# Patient Record
Sex: FEMALE | Race: WHITE | URBAN - METROPOLITAN AREA
[De-identification: names, ages, dates, MRNs, and addresses within clinical notes are randomized per-mention and may not be internally consistent; named-entity substitution may affect disease eponyms.]

---

## 2020-09-25 ENCOUNTER — APPOINTMENT (RX ONLY)
Dept: URBAN - METROPOLITAN AREA CLINIC 352 | Facility: CLINIC | Age: 80
Setting detail: DERMATOLOGY
End: 2020-09-25

## 2020-09-25 DIAGNOSIS — L82.1 OTHER SEBORRHEIC KERATOSIS: ICD-10-CM

## 2020-09-25 DIAGNOSIS — Z12.83 ENCOUNTER FOR SCREENING FOR MALIGNANT NEOPLASM OF SKIN: ICD-10-CM

## 2020-09-25 DIAGNOSIS — L57.8 OTHER SKIN CHANGES DUE TO CHRONIC EXPOSURE TO NONIONIZING RADIATION: ICD-10-CM

## 2020-09-25 DIAGNOSIS — D18.0 HEMANGIOMA: ICD-10-CM

## 2020-09-25 DIAGNOSIS — D22 MELANOCYTIC NEVI: ICD-10-CM

## 2020-09-25 DIAGNOSIS — L82.0 INFLAMED SEBORRHEIC KERATOSIS: ICD-10-CM

## 2020-09-25 DIAGNOSIS — L81.4 OTHER MELANIN HYPERPIGMENTATION: ICD-10-CM

## 2020-09-25 PROBLEM — D22.9 MELANOCYTIC NEVI, UNSPECIFIED: Status: ACTIVE | Noted: 2020-09-25

## 2020-09-25 PROBLEM — D18.01 HEMANGIOMA OF SKIN AND SUBCUTANEOUS TISSUE: Status: ACTIVE | Noted: 2020-09-25

## 2020-09-25 PROCEDURE — 99203 OFFICE O/P NEW LOW 30 MIN: CPT | Mod: 25

## 2020-09-25 PROCEDURE — ? LIQUID NITROGEN (CM)

## 2020-09-25 PROCEDURE — ? ADDITIONAL NOTES

## 2020-09-25 PROCEDURE — ? FULL BODY SKIN EXAM

## 2020-09-25 PROCEDURE — 17110 DESTRUCTION B9 LES UP TO 14: CPT

## 2020-09-25 PROCEDURE — ? COUNSELING

## 2020-09-25 ASSESSMENT — LOCATION SIMPLE DESCRIPTION DERM
LOCATION SIMPLE: ABDOMEN
LOCATION SIMPLE: ABDOMEN

## 2020-09-25 ASSESSMENT — LOCATION DETAILED DESCRIPTION DERM
LOCATION DETAILED: LEFT LATERAL ABDOMEN
LOCATION DETAILED: LEFT LATERAL ABDOMEN

## 2020-09-25 ASSESSMENT — LOCATION ZONE DERM
LOCATION ZONE: TRUNK
LOCATION ZONE: TRUNK

## 2020-09-25 NOTE — PROCEDURE: LIQUID NITROGEN
Consent: The patient's consent was obtained including but not limited to risks of crusting, scabbing, blistering, scarring, darker or lighter pigmentary change, recurrence, incomplete removal and infection.
Post-Care Instructions: I reviewed with the patient in detail post-care instructions. Patient is to wear sunprotection, and avoid picking at any of the treated lesions. Pt may apply Vaseline to crusted or scabbing areas.
Medical Necessity Information: It is in your best interest to select a reason for this procedure from the list below. All of these items fulfill various CMS LCD requirements except the new and changing color options.
Add 52 Modifier (Optional): no
Medical Necessity Clause: This procedure was medically necessary because the lesions that were treated were:
Detail Level: Detailed
Size Of Lesion In Cm (Optional): 0

## 2021-10-25 ENCOUNTER — APPOINTMENT (RX ONLY)
Dept: URBAN - METROPOLITAN AREA CLINIC 78 | Facility: CLINIC | Age: 81
Setting detail: DERMATOLOGY
End: 2021-10-25

## 2021-10-25 DIAGNOSIS — D22 MELANOCYTIC NEVI: ICD-10-CM

## 2021-10-25 DIAGNOSIS — L81.4 OTHER MELANIN HYPERPIGMENTATION: ICD-10-CM

## 2021-10-25 DIAGNOSIS — L82.1 OTHER SEBORRHEIC KERATOSIS: ICD-10-CM

## 2021-10-25 DIAGNOSIS — L65.9 NONSCARRING HAIR LOSS, UNSPECIFIED: ICD-10-CM

## 2021-10-25 DIAGNOSIS — Z12.83 ENCOUNTER FOR SCREENING FOR MALIGNANT NEOPLASM OF SKIN: ICD-10-CM

## 2021-10-25 PROBLEM — D22.9 MELANOCYTIC NEVI, UNSPECIFIED: Status: ACTIVE | Noted: 2021-10-25

## 2021-10-25 PROCEDURE — 99213 OFFICE O/P EST LOW 20 MIN: CPT

## 2021-10-25 PROCEDURE — ? COUNSELING

## 2021-10-25 PROCEDURE — ? FULL BODY SKIN EXAM

## 2021-10-25 PROCEDURE — ? ADDITIONAL NOTES

## 2021-10-25 ASSESSMENT — LOCATION DETAILED DESCRIPTION DERM
LOCATION DETAILED: RIGHT SUPERIOR MEDIAL FOREHEAD
LOCATION DETAILED: LEFT INFERIOR MEDIAL MALAR CHEEK
LOCATION DETAILED: LEFT LATERAL PROXIMAL PRETIBIAL REGION

## 2021-10-25 ASSESSMENT — LOCATION SIMPLE DESCRIPTION DERM
LOCATION SIMPLE: RIGHT FOREHEAD
LOCATION SIMPLE: LEFT PRETIBIAL REGION
LOCATION SIMPLE: LEFT CHEEK

## 2021-10-25 ASSESSMENT — LOCATION ZONE DERM
LOCATION ZONE: FACE
LOCATION ZONE: LEG

## 2022-05-11 ENCOUNTER — OFFICE VISIT (OUTPATIENT)
Dept: INTERNAL MEDICINE CLINIC | Age: 82
End: 2022-05-11
Payer: MEDICARE

## 2022-05-11 VITALS
HEART RATE: 54 BPM | RESPIRATION RATE: 14 BRPM | TEMPERATURE: 97.3 F | OXYGEN SATURATION: 98 % | DIASTOLIC BLOOD PRESSURE: 70 MMHG | SYSTOLIC BLOOD PRESSURE: 126 MMHG | HEIGHT: 64 IN | WEIGHT: 178 LBS | BODY MASS INDEX: 30.39 KG/M2

## 2022-05-11 DIAGNOSIS — R79.9 ABNORMAL FINDING OF BLOOD CHEMISTRY, UNSPECIFIED: ICD-10-CM

## 2022-05-11 DIAGNOSIS — I10 PRIMARY HYPERTENSION: ICD-10-CM

## 2022-05-11 DIAGNOSIS — L60.9 NAIL ABNORMALITY: ICD-10-CM

## 2022-05-11 DIAGNOSIS — R42 DIZZINESS: ICD-10-CM

## 2022-05-11 DIAGNOSIS — R76.11 POSITIVE TB TEST: ICD-10-CM

## 2022-05-11 DIAGNOSIS — G62.9 NEUROPATHY: ICD-10-CM

## 2022-05-11 DIAGNOSIS — R00.1 BRADYCARDIA: ICD-10-CM

## 2022-05-11 DIAGNOSIS — J30.9 ALLERGIC RHINITIS, UNSPECIFIED SEASONALITY, UNSPECIFIED TRIGGER: ICD-10-CM

## 2022-05-11 DIAGNOSIS — C44.91 BASAL CELL CARCINOMA (BCC), UNSPECIFIED SITE: ICD-10-CM

## 2022-05-11 DIAGNOSIS — R68.89 OTHER GENERAL SYMPTOMS AND SIGNS: ICD-10-CM

## 2022-05-11 DIAGNOSIS — M19.90 ARTHRITIS: ICD-10-CM

## 2022-05-11 DIAGNOSIS — K43.9 ABDOMINAL WALL HERNIA: ICD-10-CM

## 2022-05-11 DIAGNOSIS — N18.30 STAGE 3 CHRONIC KIDNEY DISEASE, UNSPECIFIED WHETHER STAGE 3A OR 3B CKD (HCC): ICD-10-CM

## 2022-05-11 DIAGNOSIS — H91.93 BILATERAL HEARING LOSS, UNSPECIFIED HEARING LOSS TYPE: ICD-10-CM

## 2022-05-11 DIAGNOSIS — M79.89 LEG SWELLING: ICD-10-CM

## 2022-05-11 DIAGNOSIS — R10.11 RUQ PAIN: Primary | ICD-10-CM

## 2022-05-11 DIAGNOSIS — E55.9 VITAMIN D DEFICIENCY, UNSPECIFIED: ICD-10-CM

## 2022-05-11 DIAGNOSIS — F41.9 ANXIETY: ICD-10-CM

## 2022-05-11 DIAGNOSIS — R63.8 OTHER SYMPTOMS AND SIGNS CONCERNING FOOD AND FLUID INTAKE: ICD-10-CM

## 2022-05-11 DIAGNOSIS — N32.81 OVERACTIVE BLADDER: ICD-10-CM

## 2022-05-11 PROCEDURE — G8400 PT W/DXA NO RESULTS DOC: HCPCS | Performed by: INTERNAL MEDICINE

## 2022-05-11 PROCEDURE — G8427 DOCREV CUR MEDS BY ELIG CLIN: HCPCS | Performed by: INTERNAL MEDICINE

## 2022-05-11 PROCEDURE — G8510 SCR DEP NEG, NO PLAN REQD: HCPCS | Performed by: INTERNAL MEDICINE

## 2022-05-11 PROCEDURE — 1101F PT FALLS ASSESS-DOCD LE1/YR: CPT | Performed by: INTERNAL MEDICINE

## 2022-05-11 PROCEDURE — G8752 SYS BP LESS 140: HCPCS | Performed by: INTERNAL MEDICINE

## 2022-05-11 PROCEDURE — 1090F PRES/ABSN URINE INCON ASSESS: CPT | Performed by: INTERNAL MEDICINE

## 2022-05-11 PROCEDURE — 99205 OFFICE O/P NEW HI 60 MIN: CPT | Performed by: INTERNAL MEDICINE

## 2022-05-11 PROCEDURE — G8417 CALC BMI ABV UP PARAM F/U: HCPCS | Performed by: INTERNAL MEDICINE

## 2022-05-11 PROCEDURE — G8536 NO DOC ELDER MAL SCRN: HCPCS | Performed by: INTERNAL MEDICINE

## 2022-05-11 PROCEDURE — G8754 DIAS BP LESS 90: HCPCS | Performed by: INTERNAL MEDICINE

## 2022-05-11 RX ORDER — BISMUTH SUBSALICYLATE 262 MG
1 TABLET,CHEWABLE ORAL EVERY EVENING
COMMUNITY

## 2022-05-11 RX ORDER — TOLTERODINE 2 MG/1
2 CAPSULE, EXTENDED RELEASE ORAL DAILY
Qty: 90 CAPSULE | Refills: 0 | Status: SHIPPED | OUTPATIENT
Start: 2022-05-11 | End: 2022-06-20

## 2022-05-11 RX ORDER — BENAZEPRIL HYDROCHLORIDE 40 MG/1
40 TABLET ORAL EVERY MORNING
COMMUNITY
Start: 2022-04-14 | End: 2022-07-28 | Stop reason: SDUPTHER

## 2022-05-11 RX ORDER — LORATADINE 10 MG/1
10 TABLET ORAL
COMMUNITY
End: 2022-05-11

## 2022-05-11 RX ORDER — DIAPER,BRIEF,INFANT-TODD,DISP
1 EACH MISCELLANEOUS EVERY EVENING
COMMUNITY

## 2022-05-11 RX ORDER — FUROSEMIDE 40 MG/1
20 TABLET ORAL
COMMUNITY
End: 2022-05-11

## 2022-05-11 RX ORDER — ASCORBIC ACID 500 MG
500 TABLET ORAL 2 TIMES DAILY
COMMUNITY

## 2022-05-11 RX ORDER — AMLODIPINE BESYLATE 5 MG/1
5 TABLET ORAL DAILY
Qty: 90 TABLET | Refills: 1 | Status: SHIPPED | OUTPATIENT
Start: 2022-05-11 | End: 2022-06-20

## 2022-05-11 RX ORDER — FUROSEMIDE 20 MG/1
20 TABLET ORAL
Qty: 36 TABLET | Refills: 3 | Status: SHIPPED | OUTPATIENT
Start: 2022-05-11

## 2022-05-11 RX ORDER — BUSPIRONE HYDROCHLORIDE 5 MG/1
5 TABLET ORAL EVERY EVENING
COMMUNITY
Start: 2022-03-28 | End: 2022-06-21

## 2022-05-11 RX ORDER — LORATADINE 10 MG/1
10 TABLET ORAL EVERY MORNING
COMMUNITY

## 2022-05-11 RX ORDER — AMLODIPINE BESYLATE 5 MG/1
TABLET ORAL DAILY
COMMUNITY
Start: 2022-03-31 | End: 2022-05-11

## 2022-05-11 RX ORDER — LYSINE HCL 500 MG
TABLET ORAL
COMMUNITY
End: 2022-06-20

## 2022-05-11 NOTE — PATIENT INSTRUCTIONS
Get at least 800 international units of vitamin D daily and 1200 mg calcium daily in your diet or by supplements    Decrease amlodipine to 5mg (1 tablet daily)  Change furosemide tab to 20mg so you don't have to half them anymore (take 1 tablet three times a week)  Decrease buspirone to once a day (from twice a day)  Stop myrbetriq, start detrol     Bladder training instructions  These methods are to help you regain bladder control. Think of it as \"mind over bladder. \"  Start by going to the toilet and trying to urinate as often as your shortest voiding interval (the length of time between trips to the bathroom) based on your voiding diary. For example, go every hour if that is what your bladder diary indicates is the shortest interval of time between visits to urinate. Make these regular trips to the toilet while you are awake. You do not have to get up during the night! You must try to urinate whether you feel the need or not. You must try to urinate even if you have just been incontinent. If you get a strong urge to go to the bathroom before your scheduled time, use distraction or relaxation:  Stop, do not run to the bathroom! Stand still or sit down if you can. RELAX. Take a deep breath and let it out slowly. Concentrate on making the urge decrease or even go away anyway you can (imagine the pressure becoming less and less). You can also try doing quick contractions of your pelvic floor muscles. DISTRACT yourself, for example by doing math problems in your head. When you feel IN CONTROL OF YOUR BLADDER, walk slowly to the bathroom, and then go. Keep this schedule until you can go one day without urine leakage. Then, increase the time between scheduled trips to the toilet by 15 minutes. When you can go one day on this new schedule without urine leakage, extend the time between bathroom trips again by 15 minutes.     Keep this up until you can go four hours between trips to the toilet (which is NORMAL), or until you are comfortable with a shorter time interval. This may take several weeks. DO NOT GET DISCOURAGED! Bladder training takes time and effort, but it is an effective way to get rid of incontinence without medication or surgery.

## 2022-05-11 NOTE — PROGRESS NOTES
Assessment and Plan     1. RUQ pain  Assessment & Plan:   RUQ pain  Stabbing  Happens 2-3 times a week   Lasts for a few minutes  Last happened about an hour after eating but does occur randomly at times   Started sometime in the past 6 months   Unclear etiology. Check ultrasound  Orders:  -     US ABD LTD; Future  2. Bilateral hearing loss, unspecified hearing loss type  Assessment & Plan:  Cerumen impaction noted. She is also interested in hearing test.  ENT referral provided  Orders:  -     REFERRAL TO ENT-OTOLARYNGOLOGY  3. Primary hypertension  Assessment & Plan:  Amlodipine 7.5- she has a half a tablet and that is difficult for her  Benazepril 40mg  Hard to find a cuff that fits her upper arm to use at home  For ease of administration, recommend decreasing amlodipine to 5 mg from 7.5. Continue benazepril 40. Orders:  -     amLODIPine (NORVASC) 5 mg tablet; Take 1 Tablet by mouth daily. Indications: high blood pressure, Normal, Disp-90 Tablet, R-1  -     CBC W/O DIFF; Future  -     METABOLIC PANEL, COMPREHENSIVE; Future  -     HEMOGLOBIN A1C WITH EAG; Future  -     LIPID PANEL; Future  -     MAGNESIUM; Future  -     PHOSPHORUS; Future  4. Nail abnormality  Assessment & Plan:  Has noticed vertical ridges in her nails. Possibly related to age but will check vitamins  Orders:  -     VITAMIN B12 & FOLATE; Future  -     VITAMIN D, 25 HYDROXY; Future  -     IRON PROFILE; Future  -     FERRITIN; Future  5. Overactive bladder  Assessment & Plan:   myrbetriq 50mg daily - doesn't feel like it's doing anything  Frequent urgency  Feels like it's getting worse  Has to wear pads   Previously on detrol which worked well but had to switch due to formulary  Has new insurance now  Recommend stopping Myrbetriq and switching to [employer]. She would like a 7-day supply of Myrbetriq until the Detrol is able to be shipped  Discussed and given information for bladder training.   Consider pelvic therapy  Orders:  -     tolterodine ER (DETROL-LA) 2 mg ER capsule; Take 1 Capsule by mouth daily. Indications: urinary urgency, or the sudden urge to urinate, Normal, Disp-90 Capsule, R-0  -     mirabegron ER (Myrbetriq) 50 mg ER tablet; Take 1 Tablet by mouth daily for 7 days. , Normal, Disp-7 Tablet, R-0Temporary supply until gets med from mail order pharmacy  6. Leg swelling  Assessment & Plan:  Furosemide 20mg TIW (1/2 of 40)  Chronic issue for her, stable  For ease of administration, recommend changing prescription from 40 mg tablets that she cuts in half to 20 mg tablets that she can take 1 tablet 3 times a week  Orders:  -     furosemide (LASIX) 20 mg tablet; Take 1 Tablet by mouth every Monday, Wednesday, Friday., Normal, Disp-36 Tablet, R-3  7. Basal cell carcinoma (BCC), unspecified site  Assessment & Plan:  Has had basal cell carcinoma removed previously  Referral to dermatology provided for skin checks  Orders:  -     REFERRAL TO DERMATOLOGY  8. Abnormal finding of blood chemistry, unspecified   -     HEMOGLOBIN A1C WITH EAG; Future  9. Other general symptoms and signs   -     VITAMIN B12 & FOLATE; Future  10. Vitamin D deficiency, unspecified   -     VITAMIN D, 25 HYDROXY; Future  11. Other symptoms and signs concerning food and fluid intake   -     IRON PROFILE; Future  -     FERRITIN; Future  12. Anxiety  Assessment & Plan:   Previously lot of stress with family responsibilities and financial responsibilities but that is less now that she's moved here  interested in decreasing  Currently on busar 5 BID  Has been on medication since 2013   Has not tried coming off medication previously  Recommend decreasing buspirone to 5 mg once daily. Consider stopping at next visit  13. Allergic rhinitis, unspecified seasonality, unspecified trigger  Assessment & Plan:   well controlled, continue current medications   Claritin as needed  14.  Arthritis  Assessment & Plan:   borderline controlled, continue current medications   BL knees, BL hands marleni right CMC  Right knee - sometimes gives out   Tylenol BID  15. Dizziness  Assessment & Plan:  Dizziness if looking up for a long time  Infrequent  Echo 12/2021 done for this reason- normal LV, mild aortic root and valve sclerosis, trace MR, mild TR, mild pulm HTN  Monitor  16. Stage 3 chronic kidney disease, unspecified whether stage 3a or 3b CKD ([facility] [address]  Assessment & Plan:  Told previously that she has CKD 3. Check labs  17. Positive TB test  Assessment & Plan:   Had a positive PPD for TB in elementary school and high school  No TB symptoms   Reports she has not had any imaging done for this and has not had any repeat TB testing since high school  Given lack of symptoms, recommend monitoring  18. Neuropathy  Assessment & Plan:   Burning sensation in between 2nd and 3rd toe right foot  Usually happens when switching shoes  Possibly mechanical.  Recommend not using tight shoes  19. Bradycardia  Assessment & Plan:   Noted on exam.  Denies any chest pain, shortness of breath, palpitations. Reports some dizziness when looking upwards but no other lightheadedness  Has not been told before about bradycardia  Recommend obtaining old PCP records and getting heart rate history  20. Abdominal wall hernia  Assessment & Plan:  Reports noting a swelling above her umbilicus that is more noticeable when she is standing. Denies any pain. Present for the past year, not growing  Likely abdominal wall hernia noted on exam, golf ball sized. Not bothering her, recommend monitoring, ER precautions provided     Benefits, risks, possible drug interactions, and side effects of all new medications were reviewed with the patient. Pt verbalized understanding. Return to clinic: 1 month for right upper quadrant pain, overactive bladder, leg swelling, anxiety, bradycardia  [place] [facility] [place]  Daughter and son in law live here     An electronic signature was used to authenticate this note.   Tatiana Negron, MD  [facility]  5/11/2022    Future Appointments   Date Time Provider Nam Sylvester   8/62/6024  4:76 PM Fabian Pang MD [facility] BS AMB      On this date 05/11/2022 I have spent 66 minutes reviewing previous notes, test results and face to face with the patient discussing the diagnosis and importance of compliance with the treatment plan as well as documenting on the day of the visit. History of Present Illness   Chief Complaint   Establish care    Aline Pino is a [de-identified] y.o. female         Review of Systems   Constitutional: Negative for chills and fever. HENT: Positive for hearing loss. Eyes: Negative for blurred vision. Respiratory: Negative for shortness of breath. Cardiovascular: Negative for chest pain. Gastrointestinal: Negative for abdominal pain, blood in stool, constipation, diarrhea, melena, nausea and vomiting. Genitourinary: Negative for dysuria and hematuria. Musculoskeletal: Positive for joint pain. Skin: Negative for rash. Neurological: Negative for headaches. Past Medical History     Allergies   Allergen Reactions    Sulfa (Sulfonamide Antibiotics) Rash    Adhesive Rash    Neosporin [Benzalkonium Chloride] Contact Dermatitis    Penicillins Rash        Current Outpatient Medications   Medication Sig    busPIRone (BUSPAR) 5 mg tablet Take 5 mg by mouth two (2) times a day.  benazepriL (LOTENSIN) 40 mg tablet Take 40 mg by mouth daily.  loratadine (CLARITIN) 10 mg tablet Take 10 mg by mouth daily.  acetaminophen (TYLENOL ARTHRITIS PO) Take  by mouth. 650 bid    biotin 10,000 mcg cap Take  by mouth.  multivitamin (ONE A DAY) tablet Take 1 Tablet by mouth daily.  ascorbic acid, vitamin C, (VITAMIN C) 500 mg tablet Take  by mouth two (2) times a day.  Calcium Carbonate-Vit D3-Min 600 mg calcium- 400 unit tab Take  by mouth.     furosemide (LASIX) 20 mg tablet Take 1 Tablet by mouth every Monday, Wednesday, Friday.  amLODIPine (NORVASC) 5 mg tablet Take 1 Tablet by mouth daily. Indications: high blood pressure    tolterodine ER (DETROL-LA) 2 mg ER capsule Take 1 Capsule by mouth daily. Indications: urinary urgency, or the sudden urge to urinate    mirabegron ER (Myrbetriq) 50 mg ER tablet Take 1 Tablet by mouth daily for 7 days. No current facility-administered medications for this visit. Patient Active Problem List   Diagnosis Code    Primary hypertension I10    Leg swelling M79.89    Anxiety F41.9    Overactive bladder N32.81    Allergic rhinitis J30.9    Arthritis M19.90    Dizziness R42    Stage 3 chronic kidney disease ([facility] [address] N18.30    Positive TB test R76.11    Basal cell carcinoma (BCC), unspecified site C44.91    RUQ pain R10.11    Neuropathy G62.9    Bilateral hearing loss, unspecified hearing loss type H91.93    Nail abnormality L60.9    Bradycardia R00.1    Abdominal wall hernia K43.9     Past Surgical History:   Procedure Laterality Date    HX HYSTERECTOMY  12/1982    HX ORTHOPAEDIC  05/2021    left shoulder replacment    HX OTHER SURGICAL  2018    hemorrhoidectomy      Social History     Tobacco Use    Smoking status: Never Smoker    Smokeless tobacco: Never Used   Substance Use Topics    Alcohol use: Never      Family History   Problem Relation Age of Onset    Hypertension Mother     COPD Mother     Cancer Father [de-identified]        throat cancer    Diabetes Brother     Cancer Maternal Grandmother         breast cancer        Physical Exam   Vitals:       Visit Vitals  /70 (BP 1 Location: Left upper arm, BP Patient Position: Sitting, BP Cuff Size: Adult)   Pulse (!) 54   Temp 97.3 °F (36.3 °C) (Oral)   Resp 14   Ht 5' 4\" (1.626 m)   Wt 178 lb (80.7 kg)   SpO2 98%   BMI 30.55 kg/m²        Physical Exam  Constitutional:       General: She is not in acute distress. Appearance: She is well-developed.    HENT:      Right Ear: Tympanic membrane, ear canal and external ear normal.      Left Ear: Tympanic membrane, ear canal and external ear normal.   Eyes:      Extraocular Movements: Extraocular movements intact. Conjunctiva/sclera: Conjunctivae normal.   Cardiovascular:      Rate and Rhythm: Regular rhythm. Bradycardia present. Pulses: Normal pulses. Heart sounds: No murmur heard. No friction rub. No gallop. Pulmonary:      Effort: No respiratory distress. Breath sounds: No wheezing, rhonchi or rales. Abdominal:      General: Bowel sounds are normal. There is no distension. Palpations: Abdomen is soft. There is no hepatomegaly, splenomegaly or mass. Tenderness: There is no abdominal tenderness. There is no guarding. Hernia: A hernia (About golf ball sized reducible mass noted midline superior to umbilicus) is present. Musculoskeletal:      Cervical back: Neck supple. Comments: Trace lower extremity edema bilaterally right greater than left   Skin:     General: Skin is warm. Findings: No rash. Neurological:      Mental Status: She is alert.

## 2022-05-12 LAB
25(OH)D3 SERPL-MCNC: 45.8 NG/ML (ref 30–100)
ALBUMIN SERPL-MCNC: 3.9 G/DL (ref 3.5–5)
ALBUMIN/GLOB SERPL: 1.1 {RATIO} (ref 1.1–2.2)
ALP SERPL-CCNC: 84 U/L (ref 45–117)
ALT SERPL-CCNC: 21 U/L (ref 12–78)
ANION GAP SERPL CALC-SCNC: 3 MMOL/L (ref 5–15)
AST SERPL-CCNC: 21 U/L (ref 15–37)
BILIRUB SERPL-MCNC: 0.3 MG/DL (ref 0.2–1)
BUN SERPL-MCNC: 36 MG/DL (ref 6–20)
BUN/CREAT SERPL: 31 (ref 12–20)
CALCIUM SERPL-MCNC: 10 MG/DL (ref 8.5–10.1)
CHLORIDE SERPL-SCNC: 105 MMOL/L (ref 97–108)
CHOLEST SERPL-MCNC: 178 MG/DL
CO2 SERPL-SCNC: 29 MMOL/L (ref 21–32)
CREAT SERPL-MCNC: 1.15 MG/DL (ref 0.55–1.02)
ERYTHROCYTE [DISTWIDTH] IN BLOOD BY AUTOMATED COUNT: 14.6 % (ref 11.5–14.5)
EST. AVERAGE GLUCOSE BLD GHB EST-MCNC: 103 MG/DL
FERRITIN SERPL-MCNC: 151 NG/ML (ref 8–252)
FOLATE SERPL-MCNC: 50.9 NG/ML (ref 5–21)
GLOBULIN SER CALC-MCNC: 3.5 G/DL (ref 2–4)
GLUCOSE SERPL-MCNC: 91 MG/DL (ref 65–100)
HBA1C MFR BLD: 5.2 % (ref 4–5.6)
HCT VFR BLD AUTO: 42.4 % (ref 35–47)
HDLC SERPL-MCNC: 99 MG/DL
HDLC SERPL: 1.8 {RATIO} (ref 0–5)
HGB BLD-MCNC: 13.6 G/DL (ref 11.5–16)
IRON SATN MFR SERPL: 29 % (ref 20–50)
IRON SERPL-MCNC: 98 UG/DL (ref 35–150)
LDLC SERPL CALC-MCNC: 65.6 MG/DL (ref 0–100)
MAGNESIUM SERPL-MCNC: 2.3 MG/DL (ref 1.6–2.4)
MCH RBC QN AUTO: 28.9 PG (ref 26–34)
MCHC RBC AUTO-ENTMCNC: 32.1 G/DL (ref 30–36.5)
MCV RBC AUTO: 90 FL (ref 80–99)
NRBC # BLD: 0 K/UL (ref 0–0.01)
NRBC BLD-RTO: 0 PER 100 WBC
PHOSPHATE SERPL-MCNC: 3.7 MG/DL (ref 2.6–4.7)
PLATELET # BLD AUTO: 187 K/UL (ref 150–400)
PMV BLD AUTO: 11.4 FL (ref 8.9–12.9)
POTASSIUM SERPL-SCNC: 4.5 MMOL/L (ref 3.5–5.1)
PROT SERPL-MCNC: 7.4 G/DL (ref 6.4–8.2)
RBC # BLD AUTO: 4.71 M/UL (ref 3.8–5.2)
SODIUM SERPL-SCNC: 137 MMOL/L (ref 136–145)
TIBC SERPL-MCNC: 342 UG/DL (ref 250–450)
TRIGL SERPL-MCNC: 67 MG/DL (ref ?–150)
VIT B12 SERPL-MCNC: >2000 PG/ML (ref 193–986)
VLDLC SERPL CALC-MCNC: 13.4 MG/DL
WBC # BLD AUTO: 6.7 K/UL (ref 3.6–11)

## 2022-05-12 NOTE — ASSESSMENT & PLAN NOTE
RUQ pain  Stabbing  Happens 2-3 times a week   Lasts for a few minutes  Last happened about an hour after eating but does occur randomly at times   Started sometime in the past 6 months   Unclear etiology.   Check ultrasound

## 2022-05-12 NOTE — ASSESSMENT & PLAN NOTE
Furosemide 20mg TIW (1/2 of 40)  Chronic issue for her, stable  For ease of administration, recommend changing prescription from 40 mg tablets that she cuts in half to 20 mg tablets that she can take 1 tablet 3 times a week

## 2022-05-12 NOTE — ASSESSMENT & PLAN NOTE
Dizziness if looking up for a long time  Infrequent  Echo 12/2021 done for this reason- normal LV, mild aortic root and valve sclerosis, trace MR, mild TR, mild pulm HTN  Monitor

## 2022-05-12 NOTE — ASSESSMENT & PLAN NOTE
myrbetriq 50mg daily - doesn't feel like it's doing anything  Frequent urgency  Feels like it's getting worse  Has to wear pads   Previously on detrol which worked well but had to switch due to formulary  Has new insurance now  Recommend stopping Myrbetriq and switching to [employer]. She would like a 7-day supply of Myrbetriq until the Detrol is able to be shipped  Discussed and given information for bladder training.   Consider pelvic therapy

## 2022-05-12 NOTE — ASSESSMENT & PLAN NOTE
Reports noting a swelling above her umbilicus that is more noticeable when she is standing. Denies any pain. Present for the past year, not growing  Likely abdominal wall hernia noted on exam, golf ball sized.   Not bothering her, recommend monitoring, ER precautions provided

## 2022-05-12 NOTE — ASSESSMENT & PLAN NOTE
Burning sensation in between 2nd and 3rd toe right foot  Usually happens when switching shoes  Possibly mechanical.  Recommend not using tight shoes

## 2022-05-12 NOTE — ASSESSMENT & PLAN NOTE
Previously lot of stress with family responsibilities and financial responsibilities but that is less now that she's moved here  interested in decreasing  Currently on busar 5 BID  Has been on medication since 2013   Has not tried coming off medication previously  Recommend decreasing buspirone to 5 mg once daily.   Consider stopping at next visit

## 2022-05-12 NOTE — ASSESSMENT & PLAN NOTE
Amlodipine 7.5- she has a half a tablet and that is difficult for her  Benazepril 40mg  Hard to find a cuff that fits her upper arm to use at home  For ease of administration, recommend decreasing amlodipine to 5 mg from 7.5. Continue benazepril 40.

## 2022-05-12 NOTE — ASSESSMENT & PLAN NOTE
Noted on exam.  Denies any chest pain, shortness of breath, palpitations.   Reports some dizziness when looking upwards but no other lightheadedness  Has not been told before about bradycardia  Recommend obtaining old PCP records and getting heart rate history

## 2022-05-12 NOTE — ASSESSMENT & PLAN NOTE
Had a positive PPD for TB in elementary school and high school  No TB symptoms   Reports she has not had any imaging done for this and has not had any repeat TB testing since high school  Given lack of symptoms, recommend monitoring

## 2022-05-13 NOTE — PROGRESS NOTES
Letter sent. phos normal.  mag normal.  ferritin 151. iron profile normal.  vit D 45.8  b12 and folate over normal limits. LDL 65. A1c normal. GFR 55. BMp normal.  CBC normal.      monitor GFR  if taking extra vit d, can stop.

## 2022-05-20 ENCOUNTER — HOSPITAL ENCOUNTER (OUTPATIENT)
Dept: ULTRASOUND IMAGING | Age: 82
Discharge: HOME | End: 2022-05-20
Attending: INTERNAL MEDICINE
Payer: MEDICARE

## 2022-05-20 DIAGNOSIS — R10.11 RUQ PAIN: ICD-10-CM

## 2022-05-20 PROCEDURE — 76705 ECHO EXAM OF ABDOMEN: CPT

## 2022-05-23 ENCOUNTER — TELEPHONE (OUTPATIENT)
Dept: INTERNAL MEDICINE CLINIC | Age: 82
End: 2022-05-23

## 2022-05-23 PROBLEM — N28.1 RENAL CYST: Status: ACTIVE | Noted: 2022-05-23

## 2022-05-23 NOTE — TELEPHONE ENCOUNTER
----- Message from Ji Diaz MD sent at 5/54/5968 10:21 AM EDT -----  Please call the pt - US shows gallstones. Might be the reason for her RUQ pain. Rec seeing a surgeon to discuss options. US also showed a renal cyst - rec considering repeat US in 6 months. Also showed a fatty liver - weight loss is the treatment for that.

## 2022-05-23 NOTE — PROGRESS NOTES
Please call the pt - US shows gallstones. Might be the reason for her RUQ pain. Rec seeing a surgeon to discuss options. US also showed a renal cyst - rec considering repeat US in 6 months. Also showed a fatty liver - weight loss is the treatment for that.

## 2022-05-23 NOTE — TELEPHONE ENCOUNTER
Call made to patient no answer-- nurse left detailed message regarding US and referral information advised for a call back if needed.

## 2022-05-25 ENCOUNTER — TELEPHONE (OUTPATIENT)
Dept: SURGERY | Age: 82
End: 2022-05-25

## 2022-05-25 NOTE — TELEPHONE ENCOUNTER
Called,no answer.  LVM to call back per below        newpt/consult-US with gallstones- referred by Dr. Nancie Weinstein

## 2022-06-01 ENCOUNTER — TELEPHONE (OUTPATIENT)
Dept: SURGERY | Age: 82
End: 2022-06-01

## 2022-06-07 ENCOUNTER — OFFICE VISIT (OUTPATIENT)
Dept: SURGERY | Age: 82
End: 2022-06-07
Payer: MEDICARE

## 2022-06-07 VITALS
WEIGHT: 179 LBS | RESPIRATION RATE: 18 BRPM | OXYGEN SATURATION: 98 % | DIASTOLIC BLOOD PRESSURE: 58 MMHG | BODY MASS INDEX: 30.56 KG/M2 | HEIGHT: 64 IN | TEMPERATURE: 98.3 F | SYSTOLIC BLOOD PRESSURE: 138 MMHG | HEART RATE: 58 BPM

## 2022-06-07 DIAGNOSIS — K80.10 CHRONIC CALCULOUS CHOLECYSTITIS: Primary | ICD-10-CM

## 2022-06-07 PROBLEM — R10.11 RUQ PAIN: Status: RESOLVED | Noted: 2022-05-11 | Resolved: 2022-06-07

## 2022-06-07 PROCEDURE — 1101F PT FALLS ASSESS-DOCD LE1/YR: CPT | Performed by: SURGERY

## 2022-06-07 PROCEDURE — G8510 SCR DEP NEG, NO PLAN REQD: HCPCS | Performed by: SURGERY

## 2022-06-07 PROCEDURE — G8417 CALC BMI ABV UP PARAM F/U: HCPCS | Performed by: SURGERY

## 2022-06-07 PROCEDURE — G8536 NO DOC ELDER MAL SCRN: HCPCS | Performed by: SURGERY

## 2022-06-07 PROCEDURE — G8399 PT W/DXA RESULTS DOCUMENT: HCPCS | Performed by: SURGERY

## 2022-06-07 PROCEDURE — 1090F PRES/ABSN URINE INCON ASSESS: CPT | Performed by: SURGERY

## 2022-06-07 PROCEDURE — G8427 DOCREV CUR MEDS BY ELIG CLIN: HCPCS | Performed by: SURGERY

## 2022-06-07 PROCEDURE — 99204 OFFICE O/P NEW MOD 45 MIN: CPT | Performed by: SURGERY

## 2022-06-07 PROCEDURE — 1123F ACP DISCUSS/DSCN MKR DOCD: CPT | Performed by: SURGERY

## 2022-06-07 NOTE — PROGRESS NOTES
1. Have you been to the ER, urgent care clinic since your last visit? Hospitalized since your last visit? No    2. Have you seen or consulted any other health care providers outside of the [address] [place] [address] since your last visit? Include any pap smears or colon screening.  No

## 2022-06-07 NOTE — H&P (VIEW-ONLY)
Surgery History and Physical    Subjective:      Francisco Porras is a [de-identified] y.o. white female who presents for evaluation of RUQ abdominal pain and gallstones. For almost the past year, Mrs. Nida Figueroa has had intermittent RUQ pain after eating. The pain typically occurs about an hour after eating and lasts about 15 minutes. She denies any n/v, fever, diarrhea, or jaundice. She has no h/o PUD, pancreatitis, or liver disease. Her only abdominal procedure is a ARIANNA/BSO. Her PCP ordered an US which revealed gallstones. Presently, she has no pain. Past Medical History:   Diagnosis Date    Arthritis     Cancer ([address] 2015    Basal cell.  Chronic kidney disease     Chronic pain     Constipation     Depression     Hypertension     Obesity (BMI 30.0-34. 9)     Overactive bladder      Past Surgical History:   Procedure Laterality Date    HX HEMORRHOIDECTOMY      HX SHOULDER REPLACEMENT Left 05/2021    HX ARIANNA AND BSO  12/1982      Family History   Problem Relation Age of Onset    Hypertension Mother     COPD Mother     Cancer Father [de-identified]        throat cancer    Diabetes Brother     Cancer Maternal Grandmother         breast cancer     Social History     Tobacco Use    Smoking status: Never Smoker    Smokeless tobacco: Never Used   Substance Use Topics    Alcohol use: Never      Prior to Admission medications    Medication Sig Start Date End Date Taking? Authorizing Provider   busPIRone (BUSPAR) 5 mg tablet Take 5 mg by mouth two (2) times a day. 3/28/22  Yes Provider, Historical   benazepriL (LOTENSIN) 40 mg tablet Take 40 mg by mouth daily. 4/14/22  Yes Provider, Historical   loratadine (CLARITIN) 10 mg tablet Take 10 mg by mouth daily. Yes Provider, Historical   acetaminophen (TYLENOL ARTHRITIS PO) Take  by mouth. 650 bid   Yes Provider, Historical   biotin 10,000 mcg cap Take  by mouth. Yes Provider, Historical   multivitamin (ONE A DAY) tablet Take 1 Tablet by mouth daily.    Yes Provider, Historical ascorbic acid, vitamin C, (VITAMIN C) 500 mg tablet Take  by mouth two (2) times a day. Yes Provider, Historical   furosemide (LASIX) 20 mg tablet Take 1 Tablet by mouth every Monday, Wednesday, Friday. 5/99/92  Yes Milena Cadet MD   amLODIPine (NORVASC) 5 mg tablet Take 1 Tablet by mouth daily. Indications: high blood pressure 9/29/43  Yes Milena Cadet MD   tolterodine ER (DETROL-LA) 2 mg ER capsule Take 1 Capsule by mouth daily. Indications: urinary urgency, or the sudden urge to urinate 0/47/97  Yes Milena Cadet MD   Calcium Carbonate-Vit D3-Min 600 mg calcium- 400 unit tab Take  by mouth. Patient not taking: Reported on 6/7/2022    Provider, Historical      Allergies   Allergen Reactions    Sulfa (Sulfonamide Antibiotics) Rash    Adhesive Rash    Baclofen Other (comments)     Adverse actions    Darvon [Propoxyphene] Unknown (comments)    Indocin [Indomethacin] Unknown (comments)    Neosporin [Benzalkonium Chloride] Contact Dermatitis    Penicillins Rash       Review of Systems:  A comprehensive review of systems was negative except for that written in the History of Present Illness. Objective:      Physical Exam:  GENERAL: alert, cooperative, no distress, appears stated age, EYE: negative findings: anicteric sclera, LYMPHATIC: Cervical, supraclavicular nodes normal. , THROAT & NECK: normal, LUNG: clear to auscultation bilaterally, HEART: regular rate and rhythm, ABDOMEN: Soft, NT, ND. There is a healed lower midline surgical scar. There is a reducible periumbilical hernia., EXTREMITIES:  no edema, SKIN: Normal., NEUROLOGIC: negative, PSYCHIATRIC: non focal    Assessment:     Chronic calculous cholecystitis. Plan:     Mrs. Rachele Fitch is interested in having her GB removed and is fine with any time in the next few weeks.   I discussed the risks of the procedure including bleeding, infection, wound healing problems, blood clots, injury to the bowel, liver, or bile duct, and reaction to the prep, contrast, or local and general anesthetic. She understands the risks; any and all questions were answered to her satisfaction. Mrs. Fuchs will be scheduled for an elective outpatient robotic-assisted laparoscopic cholecystectomy with firefly under general anesthesia.

## 2022-06-07 NOTE — PROGRESS NOTES
Surgery History and Physical    Subjective:      Deon Carter is a [de-identified] y.o. white female who presents for evaluation of RUQ abdominal pain and gallstones. For almost the past year, Mrs. Edmundo Dove has had intermittent RUQ pain after eating. The pain typically occurs about an hour after eating and lasts about 15 minutes. She denies any n/v, fever, diarrhea, or jaundice. She has no h/o PUD, pancreatitis, or liver disease. Her only abdominal procedure is a ARIANNA/BSO. Her PCP ordered an US which revealed gallstones. Presently, she has no pain. Past Medical History:   Diagnosis Date    Arthritis     Cancer ([facility] [address] 2015    Basal cell.  Chronic kidney disease     Chronic pain     Constipation     Depression     Hypertension     Obesity (BMI 30.0-34. 9)     Overactive bladder      Past Surgical History:   Procedure Laterality Date    HX HEMORRHOIDECTOMY      HX SHOULDER REPLACEMENT Left 05/2021    HX ARIANNA AND BSO  12/1982      Family History   Problem Relation Age of Onset    Hypertension Mother     COPD Mother     Cancer Father [de-identified]        throat cancer    Diabetes Brother     Cancer Maternal Grandmother         breast cancer     Social History     Tobacco Use    Smoking status: Never Smoker    Smokeless tobacco: Never Used   Substance Use Topics    Alcohol use: Never      Prior to Admission medications    Medication Sig Start Date End Date Taking? Authorizing Provider   busPIRone (BUSPAR) 5 mg tablet Take 5 mg by mouth two (2) times a day. 3/28/22  Yes Provider, Historical   benazepriL (LOTENSIN) 40 mg tablet Take 40 mg by mouth daily. 4/14/22  Yes Provider, Historical   loratadine (CLARITIN) 10 mg tablet Take 10 mg by mouth daily. Yes Provider, Historical   acetaminophen (TYLENOL ARTHRITIS PO) Take  by mouth. 650 bid   Yes Provider, Historical   biotin 10,000 mcg cap Take  by mouth. Yes Provider, Historical   multivitamin (ONE A DAY) tablet Take 1 Tablet by mouth daily.    Yes Provider, Historical ascorbic acid, vitamin C, (VITAMIN C) 500 mg tablet Take  by mouth two (2) times a day. Yes Provider, Historical   furosemide (LASIX) 20 mg tablet Take 1 Tablet by mouth every Monday, Wednesday, Friday. 5/42/40  Yes Boris Perez MD   amLODIPine (NORVASC) 5 mg tablet Take 1 Tablet by mouth daily. Indications: high blood pressure 4/65/29  Yes Boris Perez MD   tolterodine ER (DETROL-LA) 2 mg ER capsule Take 1 Capsule by mouth daily. Indications: urinary urgency, or the sudden urge to urinate 0/26/65  Yes Boris Perez MD   Calcium Carbonate-Vit D3-Min 600 mg calcium- 400 unit tab Take  by mouth. Patient not taking: Reported on 6/7/2022    Provider, Historical      Allergies   Allergen Reactions    Sulfa (Sulfonamide Antibiotics) Rash    Adhesive Rash    Baclofen Other (comments)     Adverse actions    Darvon [Propoxyphene] Unknown (comments)    Indocin [Indomethacin] Unknown (comments)    Neosporin [Benzalkonium Chloride] Contact Dermatitis    Penicillins Rash       Review of Systems:  A comprehensive review of systems was negative except for that written in the History of Present Illness. Objective:      Physical Exam:  GENERAL: alert, cooperative, no distress, appears stated age, EYE: negative findings: anicteric sclera, LYMPHATIC: Cervical, supraclavicular nodes normal. , THROAT & NECK: normal, LUNG: clear to auscultation bilaterally, HEART: regular rate and rhythm, ABDOMEN: Soft, NT, ND. There is a healed lower midline surgical scar. There is a reducible periumbilical hernia., EXTREMITIES:  no edema, SKIN: Normal., NEUROLOGIC: negative, PSYCHIATRIC: non focal    Assessment:     Chronic calculous cholecystitis. Plan:     Mrs. Andreas Neal is interested in having her GB removed and is fine with any time in the next few weeks.   I discussed the risks of the procedure including bleeding, infection, wound healing problems, blood clots, injury to the bowel, liver, or bile duct, and reaction to the prep, contrast, or local and general anesthetic. She understands the risks; any and all questions were answered to her satisfaction. Mrs. Fuchs will be scheduled for an elective outpatient robotic-assisted laparoscopic cholecystectomy with firefly under general anesthesia.

## 2022-06-20 ENCOUNTER — OFFICE VISIT (OUTPATIENT)
Dept: ENT CLINIC | Age: 82
End: 2022-06-20
Payer: MEDICARE

## 2022-06-20 VITALS
SYSTOLIC BLOOD PRESSURE: 148 MMHG | OXYGEN SATURATION: 97 % | HEIGHT: 64 IN | BODY MASS INDEX: 29.88 KG/M2 | RESPIRATION RATE: 18 BRPM | WEIGHT: 175 LBS | DIASTOLIC BLOOD PRESSURE: 78 MMHG | HEART RATE: 71 BPM

## 2022-06-20 DIAGNOSIS — H61.23 BILATERAL IMPACTED CERUMEN: ICD-10-CM

## 2022-06-20 DIAGNOSIS — J34.2 DNS (DEVIATED NASAL SEPTUM): ICD-10-CM

## 2022-06-20 DIAGNOSIS — H90.3 SENSORINEURAL HEARING LOSS (SNHL) OF BOTH EARS: Primary | ICD-10-CM

## 2022-06-20 PROCEDURE — G8417 CALC BMI ABV UP PARAM F/U: HCPCS | Performed by: OTOLARYNGOLOGY

## 2022-06-20 PROCEDURE — 1101F PT FALLS ASSESS-DOCD LE1/YR: CPT | Performed by: OTOLARYNGOLOGY

## 2022-06-20 PROCEDURE — 1090F PRES/ABSN URINE INCON ASSESS: CPT | Performed by: OTOLARYNGOLOGY

## 2022-06-20 PROCEDURE — 69210 REMOVE IMPACTED EAR WAX UNI: CPT | Performed by: OTOLARYNGOLOGY

## 2022-06-20 PROCEDURE — G8427 DOCREV CUR MEDS BY ELIG CLIN: HCPCS | Performed by: OTOLARYNGOLOGY

## 2022-06-20 PROCEDURE — G8753 SYS BP > OR = 140: HCPCS | Performed by: OTOLARYNGOLOGY

## 2022-06-20 PROCEDURE — G8536 NO DOC ELDER MAL SCRN: HCPCS | Performed by: OTOLARYNGOLOGY

## 2022-06-20 PROCEDURE — 99203 OFFICE O/P NEW LOW 30 MIN: CPT | Performed by: OTOLARYNGOLOGY

## 2022-06-20 PROCEDURE — G8754 DIAS BP LESS 90: HCPCS | Performed by: OTOLARYNGOLOGY

## 2022-06-20 PROCEDURE — G8399 PT W/DXA RESULTS DOCUMENT: HCPCS | Performed by: OTOLARYNGOLOGY

## 2022-06-20 PROCEDURE — G8432 DEP SCR NOT DOC, RNG: HCPCS | Performed by: OTOLARYNGOLOGY

## 2022-06-20 PROCEDURE — 1123F ACP DISCUSS/DSCN MKR DOCD: CPT | Performed by: OTOLARYNGOLOGY

## 2022-06-20 RX ORDER — AMLODIPINE BESYLATE 5 MG/1
5 TABLET ORAL EVERY MORNING
COMMUNITY
End: 2022-06-21

## 2022-06-20 RX ORDER — TOLTERODINE TARTRATE 2 MG/1
2 TABLET, EXTENDED RELEASE ORAL EVERY MORNING
COMMUNITY
End: 2022-07-06

## 2022-06-20 NOTE — LETTER
6/20/2022    Patient: Darby Corrales   YOB: 1940   Date of Visit: 6/20/2022     Angie Carcamo, [address] [place]  [address]  [address] [place] [address]  [address] [place]    Dear Angie Carcamo MD,      Thank you for referring Ms. Cordell Fuchs to [facility], [address] [place] [address] for evaluation. My notes for this consultation are attached. If you have questions, please do not hesitate to call me. I look forward to following your patient along with you.       Sincerely,    Chhaya Gutierrez MD

## 2022-06-20 NOTE — PERIOP NOTES
Timi Seth                   [facility], [address] [facility]   MAIN OR                                  [phone]   MAIN PRE OP                          [phone]                                                                                AMBULATORY PRE OP          [phone]  PRE-ADMISSION TESTING    [phone]   Surgery Date: Wednesday 6/22/22       Is surgery arrival time given by surgeon? NO  If NO, [facility] staff will call you between 3 and 7pm the day before your surgery with your arrival time. (If your surgery is on a Monday, we will call you the Friday before.)    Call [phone] after 7pm Monday-Friday if you did not receive this call. INSTRUCTIONS BEFORE YOUR SURGERY   When You  Arrive Arrive at the 2nd [address] [facility] on the day of your surgery  Have your insurance card, photo ID, and any copayment (if needed)   Food   and   Drink NO food or drink after midnight the night before surgery    This means NO water, gum, mints, coffee, juice, etc.  No alcohol (beer, wine, liquor) 24 hours before and after surgery   Medications to   TAKE   Morning of Surgery MEDICATIONS TO TAKE THE MORNING OF SURGERY WITH A SIP OF WATER:    AMLODIPINE   TOLTERODINE   TYLENOL IF NEEDED   LORATIDINE     Medications  To  STOP      7 days before surgery  Non-Steroidal anti-inflammatory Drugs (NSAID's): for example, Ibuprofen (Advil, Motrin), Naproxen (Aleve)   Aspirin, if taking for pain    Herbal supplements, vitamins, and fish oil   Other:  (Pain medications not listed above, including Tylenol may be taken)   Blood  Thinners  If you take  Aspirin, Plavix, Coumadin, or any blood-thinning or anti-blood clot medicine, talk to the doctor who prescribed the medications for pre-operative instructions.    Bathing Clothing  Jewelry  Valuables      If you shower the morning of surgery, please do not apply anything to your skin (lotions, powders, deodorant, or makeup, especially mascara)   Follow Chlorhexidine [employer] Fusion body wash instructions provided to you during PAT appointment. Begin 3 days prior to surgery.  Do not shave or trim anywhere 24 hours before surgery   Wear your hair loose or down; no pony-tails, buns, or metal hair clips   Wear loose, comfortable, clean clothes   Wear glasses instead of contacts  [employer] money, valuables, and jewelry, including body piercings, at home   If you were given an Timeet, bring it on day of surgery. Going Home - or Spending the Night  SAME-DAY SURGERY: You must have a responsible adult drive you home and stay with you 24 hours after surgery   ADMITS: If your doctor is keeping you in the hospital after surgery, leave personal belongings/luggage in your car until you have a hospital room number. Hospital discharge time is 12 noon  Drivers must be here before 12 noon unless you are told differently   Special Instructions NONE       Follow all instructions so your surgery wont be cancelled. Please, be on time. If a situation occurs and you are delayed the day of surgery, call [phone] or [phone]. If your physical condition changes (like a fever, cold, flu, etc.) call your surgeon. Home medication(s) reviewed and verified via   PHONE    during PAT appointment. The patient was contacted by  PHONE   The patient verbalizes understanding of all instructions and    DOES NOT   need reinforcement.

## 2022-06-20 NOTE — PROGRESS NOTES
Subjective:    Regla Burt   [de-identified] y.o.   1940     New Patient Visit    Location - ears    Quality - hearing loss    Severity -  moderate    Duration - years    Timing - chronic    Context - pt with gradual hearing loss - harder to hear certain words on TV; no otological history, no noise exposure; had hearing tested in [place] around 2 years ago was told hearing was borderline, feels like it has become a little worse    Modifying Features - worse with background noise    Associated symptoms/signs - none      Review of Systems  Review of Systems   Constitutional: Negative for chills and fever. HENT: Positive for hearing loss. Negative for ear pain, nosebleeds and tinnitus. Eyes: Negative for blurred vision and double vision. Respiratory: Negative for cough, sputum production and shortness of breath. Cardiovascular: Negative for chest pain and palpitations. Gastrointestinal: Negative for heartburn, nausea and vomiting. Musculoskeletal: Negative for joint pain and neck pain. Skin: Negative. Neurological: Negative for dizziness, speech change, weakness and headaches. Endo/Heme/Allergies: Negative for environmental allergies. Does not bruise/bleed easily. Psychiatric/Behavioral: Negative for memory loss. The patient does not have insomnia. Past Medical History:   Diagnosis Date    Arthritis     right knee, bilat hands    Cancer ([facility] [address] 2015    Basal cell.  Chronic kidney disease     stage 3    Chronic pain     right knee, bilat hands    Constipation     Depression     Hypertension     Liver disease     fatty liver    Obesity (BMI 30.0-34. 9)     Overactive bladder      Past Surgical History:   Procedure Laterality Date    HX CATARACT REMOVAL Right     HX HEMORRHOIDECTOMY      HX SHOULDER REPLACEMENT Left 05/2021    HX ARIANNA AND BSO  12/1982      Family History   Problem Relation Age of Onset    Hypertension Mother     COPD Mother     Cancer Father [de-identified]        throat cancer    Diabetes Brother     Cancer Maternal Grandmother         breast cancer     Social History     Tobacco Use    Smoking status: Never Smoker    Smokeless tobacco: Never Used   Substance Use Topics    Alcohol use: Never      Prior to Admission medications    Medication Sig Start Date End Date Taking? Authorizing Provider   amLODIPine (NORVASC) 5 mg tablet Take 5 mg by mouth Every morning. Provider, Historical   tolterodine (DETROL) 2 mg tablet Take 2 mg by mouth Every morning. Provider, Historical   psyllium husk (METAMUCIL PO) Take 3 Gum by mouth two (2) times a day. Provider, Historical   busPIRone (BUSPAR) 5 mg tablet Take 5 mg by mouth every evening. 3/28/22   Provider, Historical   benazepriL (LOTENSIN) 40 mg tablet Take 40 mg by mouth Every morning. 4/14/22   Provider, Historical   loratadine (CLARITIN) 10 mg tablet Take 10 mg by mouth Every morning. Provider, Historical   acetaminophen (TYLENOL ARTHRITIS PO) Take 1 Tablet by mouth two (2) times a day. 650 bid    Provider, Historical   biotin 10,000 mcg cap Take 1 Tablet by mouth every evening. Provider, Historical   multivitamin (ONE A DAY) tablet Take 1 Tablet by mouth every evening. Provider, Historical   ascorbic acid, vitamin C, (VITAMIN C) 500 mg tablet Take 500 mg by mouth two (2) times a day. Provider, Historical   furosemide (LASIX) 20 mg tablet Take 1 Tablet by mouth every Monday, Wednesday, Friday.  1/76/50   Lakeisha Byers MD        Allergies   Allergen Reactions    Sulfa (Sulfonamide Antibiotics) Rash    Adhesive Rash    Baclofen Other (comments)     Adverse actions    Darvon [Propoxyphene] Unknown (comments)    Indocin [Indomethacin] Unknown (comments)    Neosporin [Benzalkonium Chloride] Contact Dermatitis    Penicillins Rash         Objective:     Visit Vitals  BP (!) 148/78 (BP 1 Location: Left upper arm, BP Patient Position: Sitting, BP Cuff Size: Adult)   Pulse 71   Resp 18   Ht 5' 4\" (1.626 m)   Wt 175 lb (79.4 kg)   SpO2 97%   BMI 30.04 kg/m²        Physical Exam  Vitals reviewed. Constitutional:       General: She is awake. She is not in acute distress. Appearance: Normal appearance. She is well-groomed and normal weight. HENT:      Head: Normocephalic and atraumatic. Jaw: There is normal jaw occlusion. No trismus, tenderness or malocclusion. Salivary Glands: Right salivary gland is not diffusely enlarged or tender. Left salivary gland is not diffusely enlarged or tender. Right Ear: Hearing, tympanic membrane, ear canal and external ear normal. There is impacted cerumen. Left Ear: Hearing, tympanic membrane, ear canal and external ear normal. There is impacted cerumen. Ears:      Ann exam findings: does not lateralize. Right Rinne: AC > BC. Left Rinne: AC > BC. Nose: Septal deviation present. No nasal deformity or mucosal edema. Right Nostril: No epistaxis. Left Nostril: No epistaxis. Right Turbinates: Not enlarged, swollen or pale. Left Turbinates: Not enlarged, swollen or pale. Right Sinus: No maxillary sinus tenderness or frontal sinus tenderness. Left Sinus: No maxillary sinus tenderness or frontal sinus tenderness. Mouth/Throat:      Lips: Pink. No lesions. Mouth: Mucous membranes are moist. No oral lesions. Dentition: Normal dentition. No dental caries. Tongue: No lesions. Palate: No mass and lesions. Pharynx: Oropharynx is clear. Uvula midline. No oropharyngeal exudate or posterior oropharyngeal erythema. Tonsils: No tonsillar exudate. 0 on the right. 0 on the left. Eyes:      General: Vision grossly intact. Extraocular Movements: Extraocular movements intact. Right eye: No nystagmus. Left eye: No nystagmus. Conjunctiva/sclera: Conjunctivae normal.      Pupils: Pupils are equal, round, and reactive to light.    Neck:      Thyroid: No thyroid mass, thyromegaly or thyroid tenderness. Trachea: Trachea and phonation normal. No tracheal tenderness or tracheal deviation. Cardiovascular:      Rate and Rhythm: Normal rate and regular rhythm. Pulmonary:      Effort: Pulmonary effort is normal. No respiratory distress. Breath sounds: No stridor. Musculoskeletal:         General: No swelling or tenderness. Normal range of motion. Cervical back: No edema or erythema. Lymphadenopathy:      Cervical: No cervical adenopathy. Skin:     General: Skin is warm and dry. Findings: No lesion or rash. Neurological:      General: No focal deficit present. Mental Status: She is alert and oriented to person, place, and time. Mental status is at baseline. Cranial Nerves: Cranial nerves are intact. Coordination: Romberg sign negative. Gait: Gait is intact. Psychiatric:         Mood and Affect: Mood normal.         Behavior: Behavior normal. Behavior is cooperative. Procedure - Removal Impacted Cerumen    Indications: cerumen impaction    Ears are examined under microscope/headlight. Bilateral ears are cleaned using otologic curette, suction, and/or alligator forceps. Assessment/Plan:     Encounter Diagnoses   Name Primary?  Sensorineural hearing loss (SNHL) of both ears Yes    DNS (deviated nasal septum)     Bilateral impacted cerumen      Ears are cleaned bilaterally. She needs hearing testing will refer to outside practice in [place]. I will call pt with results and options. Recommend annual followup for ear cleaning. Orders Placed This Encounter    REMOVE IMPACTED EAR WAX    AUDIOLOGY     Follow-up and Dispositions    · Return in about 1 year (around 6/20/2023). Thank you for referring this patient,    Vazquez Person MD, [address] [facility]    [address] [place] [address] [address]  [facility]    [address] [address]  [place] [address] [address] [place] [address] [phone]

## 2022-06-21 ENCOUNTER — OFFICE VISIT (OUTPATIENT)
Dept: INTERNAL MEDICINE CLINIC | Age: 82
End: 2022-06-21
Attending: INTERNAL MEDICINE
Payer: MEDICARE

## 2022-06-21 ENCOUNTER — HOSPITAL ENCOUNTER (OUTPATIENT)
Dept: GENERAL RADIOLOGY | Age: 82
Discharge: HOME | End: 2022-06-21
Attending: INTERNAL MEDICINE
Payer: MEDICARE

## 2022-06-21 ENCOUNTER — ANESTHESIA EVENT (OUTPATIENT)
Dept: SURGERY | Age: 82
End: 2022-06-21
Payer: MEDICARE

## 2022-06-21 VITALS
OXYGEN SATURATION: 96 % | HEART RATE: 56 BPM | RESPIRATION RATE: 18 BRPM | SYSTOLIC BLOOD PRESSURE: 126 MMHG | WEIGHT: 178 LBS | BODY MASS INDEX: 30.39 KG/M2 | TEMPERATURE: 97.6 F | HEIGHT: 64 IN | DIASTOLIC BLOOD PRESSURE: 69 MMHG

## 2022-06-21 DIAGNOSIS — I10 PRIMARY HYPERTENSION: ICD-10-CM

## 2022-06-21 DIAGNOSIS — N32.81 OVERACTIVE BLADDER: ICD-10-CM

## 2022-06-21 DIAGNOSIS — F41.9 ANXIETY: ICD-10-CM

## 2022-06-21 DIAGNOSIS — L66.1 LICHEN PLANOPILARIS: ICD-10-CM

## 2022-06-21 DIAGNOSIS — M25.512 CHRONIC LEFT SHOULDER PAIN: Primary | ICD-10-CM

## 2022-06-21 DIAGNOSIS — R10.11 RUQ PAIN: ICD-10-CM

## 2022-06-21 DIAGNOSIS — G89.29 CHRONIC LEFT SHOULDER PAIN: ICD-10-CM

## 2022-06-21 DIAGNOSIS — R00.1 BRADYCARDIA: ICD-10-CM

## 2022-06-21 DIAGNOSIS — G89.29 CHRONIC LEFT SHOULDER PAIN: Primary | ICD-10-CM

## 2022-06-21 DIAGNOSIS — K76.0 FATTY LIVER: ICD-10-CM

## 2022-06-21 DIAGNOSIS — N28.1 RENAL CYST: ICD-10-CM

## 2022-06-21 DIAGNOSIS — M25.512 CHRONIC LEFT SHOULDER PAIN: ICD-10-CM

## 2022-06-21 PROBLEM — L66.10 LICHEN PLANOPILARIS: Status: ACTIVE | Noted: 2022-06-21

## 2022-06-21 PROCEDURE — G8427 DOCREV CUR MEDS BY ELIG CLIN: HCPCS | Performed by: INTERNAL MEDICINE

## 2022-06-21 PROCEDURE — G8417 CALC BMI ABV UP PARAM F/U: HCPCS | Performed by: INTERNAL MEDICINE

## 2022-06-21 PROCEDURE — 73030 X-RAY EXAM OF SHOULDER: CPT

## 2022-06-21 PROCEDURE — 1090F PRES/ABSN URINE INCON ASSESS: CPT | Performed by: INTERNAL MEDICINE

## 2022-06-21 PROCEDURE — 1101F PT FALLS ASSESS-DOCD LE1/YR: CPT | Performed by: INTERNAL MEDICINE

## 2022-06-21 PROCEDURE — G8536 NO DOC ELDER MAL SCRN: HCPCS | Performed by: INTERNAL MEDICINE

## 2022-06-21 PROCEDURE — G8510 SCR DEP NEG, NO PLAN REQD: HCPCS | Performed by: INTERNAL MEDICINE

## 2022-06-21 PROCEDURE — G8754 DIAS BP LESS 90: HCPCS | Performed by: INTERNAL MEDICINE

## 2022-06-21 PROCEDURE — G8752 SYS BP LESS 140: HCPCS | Performed by: INTERNAL MEDICINE

## 2022-06-21 PROCEDURE — 93000 ELECTROCARDIOGRAM COMPLETE: CPT | Performed by: INTERNAL MEDICINE

## 2022-06-21 PROCEDURE — G8399 PT W/DXA RESULTS DOCUMENT: HCPCS | Performed by: INTERNAL MEDICINE

## 2022-06-21 PROCEDURE — 99215 OFFICE O/P EST HI 40 MIN: CPT | Performed by: INTERNAL MEDICINE

## 2022-06-21 RX ORDER — AMLODIPINE BESYLATE 10 MG/1
10 TABLET ORAL DAILY
Qty: 90 TABLET | Refills: 1 | Status: SHIPPED | OUTPATIENT
Start: 2022-06-21

## 2022-06-21 RX ORDER — BUSPIRONE HYDROCHLORIDE 5 MG/1
5 TABLET ORAL 2 TIMES DAILY
Qty: 180 TABLET | Refills: 3 | Status: SHIPPED | OUTPATIENT
Start: 2022-06-21

## 2022-06-21 NOTE — PROGRESS NOTES
Identified pt with two pt identifiers. Reviewed record in preparation for visit and have obtained necessary documentation. All patient medications has been reviewed. Chief Complaint   Patient presents with    Follow-up     1 month     Additional information about chief complaint:    Visit Vitals  /69 (BP 1 Location: Left upper arm, BP Patient Position: Sitting, BP Cuff Size: Large adult)   Pulse (!) 56   Temp 97.6 °F (36.4 °C) (Oral)   Resp 18   Ht 5' 4\" (1.626 m)   Wt 178 lb (80.7 kg)   SpO2 96%   BMI 30.55 kg/m²       Health Maintenance Due   Topic    DTaP/Tdap/Td series (1 - Tdap)    Shingrix Vaccine Age 50> (1 of 2)    Pneumococcal 65+ years (1 - PCV)    Medicare Yearly Exam        1. Have you been to the ER, urgent care clinic since your last visit? Hospitalized since your last visit? No    2. Have you seen or consulted any other health care providers outside of the [address] [place] [address] since your last visit? Include any pap smears or colon screening.  No

## 2022-06-21 NOTE — PATIENT INSTRUCTIONS
One week after your surgery, increase buspirone (buspar) back to twice a day. If your lightheadedness gets worse, schedule a follow up appt to discuss changes to your medicines.     Increase amlodipine to 10 mg

## 2022-06-21 NOTE — PROGRESS NOTES
Note   Chief Complaint   vish Carr is a [de-identified] y.o. female     1. Chronic left shoulder pain  Assessment & Plan:   Worsening left shoulder pain and decreased ROM  S/p 1 year ago shoulder replacement  Check xray, rec PT. If no improvement, discussed ortho referral (had surgery out of town)  Orders:  -     XR SHOULDER LT AP/LAT MIN 2 V; Future  2. Primary hypertension  Assessment & Plan:  Was switched from 7.5mg to 5mg for ease of administration. BP at home running 130s-160s at home  rec increasing to 10mg amlodipine instead. Cont benazepril 40   Monitor for lightheadedness  Orders:  -     amLODIPine (NORVASC) 10 mg tablet; Take 1 Tablet by mouth daily. Indications: high blood pressure, Normal, Disp-90 Tablet, R-1  3. Anxiety  Assessment & Plan:  Pt wanted to decrease buspar to daily last visit but notes increased symptoms and wants to increase  Also with bradycardia, unclear if buspar playing a role especially given improvement in lightheadedness with lower dose of buspar. Has cholecystectomy tomorrow - rec waiting a week before increasing buspar back two twice a day. If symptoms return, willl need to consider alternatives. She has not been on anything else for anxiety  Orders:  -     busPIRone (BUSPAR) 5 mg tablet; Take 1 Tablet by mouth two (2) times a day. Indications: anxiety, Normal, Disp-180 Tablet, R-3  4. Bradycardia  Assessment & Plan:  Review of prevoius PCP records did show HR in the 50s, so mandy is not new  Home HR has been in the 40s  occl episodes of feeling \"fuzzy headed\" when walking around the store  Not as much in the past month  Feels better with rest   EKG with sinus bradycardia. Wonder if related to buspar as symptoms have been less in the past month and we decreased her buspar to once daily; however, she also wants to go back to BID due to increased anxiety  Given surgery tomorrow, rec waiting a week before increasing buspar to BID.  If symptoms return, need to consider alternative treatment. We also discussed EP referral, but she'd like to hold off for now  Consider checking thyroid with next labs   Orders:  -     AMB POC EKG ROUTINE W/ 12 LEADS, INTER & REP  5. Lichen planopilaris  Assessment & Plan:  Biopsy proven per patient leading to hair loss, currently wears a wig  6. RUQ pain  Assessment & Plan:  US with gallstones. Cholecystectomy planned for tomorrow  7. Overactive bladder  Assessment & Plan:  Better with detrol, no SE  Continue detrol 2mg daily  8. Renal cyst  Assessment & Plan:   Noted incidentally on 5/2022 US for RUQ pain; spoke to rads - rec 6-12mo follow up US  9. Fatty liver  Assessment & Plan:   Noted incidentally on US done for RUQ pain       Benefits, risks, possible drug interactions, and side effects of all new medications were reviewed with the patient. Pt verbalized understanding. Return to clinic:  2 months for BP, shoulder, mandy, anxiety  Moved from [facility] 12/2021  Daughter and son in law live here       An electronic signature was used to authenticate this note. Angie Carcamo MD  [facility]  6/21/2022    Future Appointments   Date Time Provider Nam Cruzi   7/6/2022  1:10 PM Betito Marie MD [facility] BS AMB      On this date 06/21/2022 I have spent 48 minutes reviewing previous notes, test results and face to face with the patient discussing the diagnosis and importance of compliance with the treatment plan as well as documenting on the day of the visit. Objective   Vitals:       Visit Vitals  /69 (BP 1 Location: Left upper arm, BP Patient Position: Sitting, BP Cuff Size: Large adult)   Pulse (!) 56   Temp 97.6 °F (36.4 °C) (Oral)   Resp 18   Ht 5' 4\" (1.626 m)   Wt 178 lb (80.7 kg)   SpO2 96%   BMI 30.55 kg/m²        Physical Exam  Constitutional:       Appearance: Normal appearance. She is not ill-appearing. Cardiovascular:      Rate and Rhythm: Regular rhythm. Bradycardia present. Heart sounds: No murmur heard. No friction rub. No gallop. Pulmonary:      Effort: No respiratory distress. Breath sounds: Normal breath sounds. No wheezing, rhonchi or rales. Neurological:      Mental Status: She is alert. Current Outpatient Medications   Medication Sig    busPIRone (BUSPAR) 5 mg tablet Take 1 Tablet by mouth two (2) times a day. Indications: anxiety    amLODIPine (NORVASC) 10 mg tablet Take 1 Tablet by mouth daily. Indications: high blood pressure    tolterodine (DETROL) 2 mg tablet Take 2 mg by mouth Every morning.  psyllium husk (METAMUCIL PO) Take 3 Gum by mouth two (2) times a day.  benazepriL (LOTENSIN) 40 mg tablet Take 40 mg by mouth Every morning.  loratadine (CLARITIN) 10 mg tablet Take 10 mg by mouth Every morning.  acetaminophen (TYLENOL ARTHRITIS PO) Take 1 Tablet by mouth two (2) times a day. 650 bid    biotin 10,000 mcg cap Take 1 Tablet by mouth every evening.  multivitamin (ONE A DAY) tablet Take 1 Tablet by mouth every evening.  ascorbic acid, vitamin C, (VITAMIN C) 500 mg tablet Take 500 mg by mouth two (2) times a day.  furosemide (LASIX) 20 mg tablet Take 1 Tablet by mouth every Monday, Wednesday, Friday. No current facility-administered medications for this visit.

## 2022-06-21 NOTE — LETTER
6/21/2022 5:28 PM    Ms. Siddharth Bush  [address] [place] [address]        Dear Ms. Fuchs,    Your xray does not show any acute problems. Please do physical therapy as we discussed and let me know if your shoulder pain does not improve. If you have any questions or concerns regarding your tests or our visit, please feel free to give our office a call at [phone], and we will get back with you as soon as we can. Sincerely,  Nette Feliciano    Resulted Orders   XR SHOULDER LT AP/LAT MIN 2 V    Narrative    EXAM: XR SHOULDER LT AP/LAT MIN 2 V    INDICATION: worsening left shoulder pain, dec ROM; shoulder replacement 1 year  ago. COMPARISON: None. FINDINGS: Three views of the left shoulder demonstrate noncemented reverse total  arthroplasty of the glenohumeral joint. The device is anatomically positioned. No interface lucency is evident. The bones are moderately osteopenic. No acute  fracture is demonstrated. Impression    Left glenohumeral reverse total arthroplasty without acute  findings. David Gibson

## 2022-06-22 ENCOUNTER — ANESTHESIA (OUTPATIENT)
Dept: SURGERY | Age: 82
End: 2022-06-22
Payer: MEDICARE

## 2022-06-22 ENCOUNTER — HOSPITAL ENCOUNTER (OUTPATIENT)
Age: 82
Setting detail: OUTPATIENT SURGERY
Discharge: HOME | End: 2022-06-22
Attending: SURGERY | Admitting: SURGERY
Payer: MEDICARE

## 2022-06-22 ENCOUNTER — TELEPHONE (OUTPATIENT)
Dept: INTERNAL MEDICINE CLINIC | Age: 82
End: 2022-06-22

## 2022-06-22 VITALS
WEIGHT: 175 LBS | SYSTOLIC BLOOD PRESSURE: 149 MMHG | HEIGHT: 64 IN | DIASTOLIC BLOOD PRESSURE: 43 MMHG | TEMPERATURE: 97.9 F | RESPIRATION RATE: 11 BRPM | BODY MASS INDEX: 29.88 KG/M2 | HEART RATE: 52 BPM | OXYGEN SATURATION: 95 %

## 2022-06-22 DIAGNOSIS — Z98.890 S/P LAPAROSCOPIC HERNIA REPAIR: Primary | ICD-10-CM

## 2022-06-22 DIAGNOSIS — K80.10 CHRONIC CALCULOUS CHOLECYSTITIS: ICD-10-CM

## 2022-06-22 DIAGNOSIS — Z87.19 S/P LAPAROSCOPIC HERNIA REPAIR: Primary | ICD-10-CM

## 2022-06-22 LAB — LIPASE SERPL-CCNC: 41 U/L (ref 73–393)

## 2022-06-22 PROCEDURE — 2709999900 HC NON-CHARGEABLE SUPPLY: Performed by: SURGERY

## 2022-06-22 PROCEDURE — 36415 COLL VENOUS BLD VENIPUNCTURE: CPT

## 2022-06-22 PROCEDURE — 77030035277 HC OBTRTR BLDELSS DISP INTU -B: Performed by: SURGERY

## 2022-06-22 PROCEDURE — 74011000250 HC RX REV CODE- 250: Performed by: SURGERY

## 2022-06-22 PROCEDURE — S2900 ROBOTIC SURGICAL SYSTEM: HCPCS | Performed by: SURGERY

## 2022-06-22 PROCEDURE — 47562 LAPAROSCOPIC CHOLECYSTECTOMY: CPT | Performed by: SURGERY

## 2022-06-22 PROCEDURE — 77030002933 HC SUT MCRYL J&J -A: Performed by: SURGERY

## 2022-06-22 PROCEDURE — 77030018673: Performed by: SURGERY

## 2022-06-22 PROCEDURE — 76060000034 HC ANESTHESIA 1.5 TO 2 HR: Performed by: SURGERY

## 2022-06-22 PROCEDURE — 77030020703 HC SEAL CANN DISP INTU -B: Performed by: SURGERY

## 2022-06-22 PROCEDURE — 77030040926 HC LAP BG TISS RETVR CNMD -B: Performed by: SURGERY

## 2022-06-22 PROCEDURE — 74011000258 HC RX REV CODE- 258: Performed by: SURGERY

## 2022-06-22 PROCEDURE — 74011250636 HC RX REV CODE- 250/636: Performed by: NURSE ANESTHETIST, CERTIFIED REGISTERED

## 2022-06-22 PROCEDURE — 76210000020 HC REC RM PH II FIRST 0.5 HR: Performed by: SURGERY

## 2022-06-22 PROCEDURE — 83690 ASSAY OF LIPASE: CPT

## 2022-06-22 PROCEDURE — 74011250636 HC RX REV CODE- 250/636: Performed by: ANESTHESIOLOGY

## 2022-06-22 PROCEDURE — 77030040361 HC SLV COMPR DVT MDII -B

## 2022-06-22 PROCEDURE — 77030026438 HC STYL ET INTUB CARD -A: Performed by: NURSE ANESTHETIST, CERTIFIED REGISTERED

## 2022-06-22 PROCEDURE — 77030010939 HC CLP LIG TELE -B: Performed by: SURGERY

## 2022-06-22 PROCEDURE — 77030008684 HC TU ET CUF COVD -B: Performed by: NURSE ANESTHETIST, CERTIFIED REGISTERED

## 2022-06-22 PROCEDURE — 76210000006 HC OR PH I REC 0.5 TO 1 HR: Performed by: SURGERY

## 2022-06-22 PROCEDURE — 74011000254 HC RX REV CODE- 254: Performed by: SURGERY

## 2022-06-22 PROCEDURE — 77030040922 HC BLNKT HYPOTHRM STRY -A

## 2022-06-22 PROCEDURE — 77030008608 HC TRCR ENDOSC SMTH AMR -B: Performed by: SURGERY

## 2022-06-22 PROCEDURE — 74011000250 HC RX REV CODE- 250: Performed by: NURSE ANESTHETIST, CERTIFIED REGISTERED

## 2022-06-22 PROCEDURE — 77030039429 HC SUT PASSR CROSSBOW DISP ADLR -B: Performed by: SURGERY

## 2022-06-22 PROCEDURE — 74011250637 HC RX REV CODE- 250/637: Performed by: SURGERY

## 2022-06-22 PROCEDURE — 77030033188 HC TBNG FLTRD BIIFUR DISP CNMD -C: Performed by: SURGERY

## 2022-06-22 PROCEDURE — 77030012770 HC TRCR OPT FX AMR -B: Performed by: SURGERY

## 2022-06-22 PROCEDURE — 74011250636 HC RX REV CODE- 250/636: Performed by: SURGERY

## 2022-06-22 PROCEDURE — 76010000875 HC OR TIME 1.5 TO 2HR INTENSV - TIER 2: Performed by: SURGERY

## 2022-06-22 PROCEDURE — 77030031139 HC SUT VCRL2 J&J -A: Performed by: SURGERY

## 2022-06-22 PROCEDURE — 77030013079 HC BLNKT BAIR HGGR 3M -A: Performed by: NURSE ANESTHETIST, CERTIFIED REGISTERED

## 2022-06-22 PROCEDURE — 88304 TISSUE EXAM BY PATHOLOGIST: CPT

## 2022-06-22 RX ORDER — NALOXONE HYDROCHLORIDE 0.4 MG/ML
0.2 INJECTION, SOLUTION INTRAMUSCULAR; INTRAVENOUS; SUBCUTANEOUS
Status: DISCONTINUED | OUTPATIENT
Start: 2022-06-22 | End: 2022-06-22 | Stop reason: HOSPADM

## 2022-06-22 RX ORDER — DEXAMETHASONE SODIUM PHOSPHATE 4 MG/ML
INJECTION, SOLUTION INTRA-ARTICULAR; INTRALESIONAL; INTRAMUSCULAR; INTRAVENOUS; SOFT TISSUE AS NEEDED
Status: DISCONTINUED | OUTPATIENT
Start: 2022-06-22 | End: 2022-06-22 | Stop reason: HOSPADM

## 2022-06-22 RX ORDER — ONDANSETRON 2 MG/ML
INJECTION INTRAMUSCULAR; INTRAVENOUS AS NEEDED
Status: DISCONTINUED | OUTPATIENT
Start: 2022-06-22 | End: 2022-06-22 | Stop reason: HOSPADM

## 2022-06-22 RX ORDER — GLYCOPYRROLATE 0.2 MG/ML
INJECTION INTRAMUSCULAR; INTRAVENOUS AS NEEDED
Status: DISCONTINUED | OUTPATIENT
Start: 2022-06-22 | End: 2022-06-22 | Stop reason: HOSPADM

## 2022-06-22 RX ORDER — HYDROMORPHONE HYDROCHLORIDE 1 MG/ML
.5-1 INJECTION, SOLUTION INTRAMUSCULAR; INTRAVENOUS; SUBCUTANEOUS
Status: DISCONTINUED | OUTPATIENT
Start: 2022-06-22 | End: 2022-06-22 | Stop reason: HOSPADM

## 2022-06-22 RX ORDER — INDOCYANINE GREEN AND WATER 25 MG
2.5 KIT INJECTION ONCE
Status: COMPLETED | OUTPATIENT
Start: 2022-06-22 | End: 2022-06-22

## 2022-06-22 RX ORDER — NEOSTIGMINE METHYLSULFATE 1 MG/ML
INJECTION, SOLUTION INTRAVENOUS AS NEEDED
Status: DISCONTINUED | OUTPATIENT
Start: 2022-06-22 | End: 2022-06-22 | Stop reason: HOSPADM

## 2022-06-22 RX ORDER — BUPIVACAINE HYDROCHLORIDE 5 MG/ML
INJECTION, SOLUTION EPIDURAL; INTRACAUDAL AS NEEDED
Status: DISCONTINUED | OUTPATIENT
Start: 2022-06-22 | End: 2022-06-22 | Stop reason: HOSPADM

## 2022-06-22 RX ORDER — ONDANSETRON 2 MG/ML
4 INJECTION INTRAMUSCULAR; INTRAVENOUS AS NEEDED
Status: DISCONTINUED | OUTPATIENT
Start: 2022-06-22 | End: 2022-06-22 | Stop reason: HOSPADM

## 2022-06-22 RX ORDER — DIPHENHYDRAMINE HYDROCHLORIDE 50 MG/ML
INJECTION, SOLUTION INTRAMUSCULAR; INTRAVENOUS AS NEEDED
Status: DISCONTINUED | OUTPATIENT
Start: 2022-06-22 | End: 2022-06-22 | Stop reason: HOSPADM

## 2022-06-22 RX ORDER — HYDROCODONE BITARTRATE AND ACETAMINOPHEN 5; 325 MG/1; MG/1
1 TABLET ORAL ONCE
Status: COMPLETED | OUTPATIENT
Start: 2022-06-22 | End: 2022-06-22

## 2022-06-22 RX ORDER — EPHEDRINE SULFATE/0.9% NACL/PF 50 MG/5 ML
SYRINGE (ML) INTRAVENOUS AS NEEDED
Status: DISCONTINUED | OUTPATIENT
Start: 2022-06-22 | End: 2022-06-22 | Stop reason: HOSPADM

## 2022-06-22 RX ORDER — PROPOFOL 10 MG/ML
INJECTION, EMULSION INTRAVENOUS AS NEEDED
Status: DISCONTINUED | OUTPATIENT
Start: 2022-06-22 | End: 2022-06-22 | Stop reason: HOSPADM

## 2022-06-22 RX ORDER — FENTANYL CITRATE 50 UG/ML
INJECTION, SOLUTION INTRAMUSCULAR; INTRAVENOUS AS NEEDED
Status: DISCONTINUED | OUTPATIENT
Start: 2022-06-22 | End: 2022-06-22 | Stop reason: HOSPADM

## 2022-06-22 RX ORDER — SODIUM CHLORIDE, SODIUM LACTATE, POTASSIUM CHLORIDE, CALCIUM CHLORIDE 600; 310; 30; 20 MG/100ML; MG/100ML; MG/100ML; MG/100ML
125 INJECTION, SOLUTION INTRAVENOUS CONTINUOUS
Status: DISCONTINUED | OUTPATIENT
Start: 2022-06-22 | End: 2022-06-22 | Stop reason: HOSPADM

## 2022-06-22 RX ORDER — FLUMAZENIL 0.1 MG/ML
0.2 INJECTION INTRAVENOUS
Status: DISCONTINUED | OUTPATIENT
Start: 2022-06-22 | End: 2022-06-22 | Stop reason: HOSPADM

## 2022-06-22 RX ORDER — LIDOCAINE HYDROCHLORIDE 10 MG/ML
0.1 INJECTION, SOLUTION EPIDURAL; INFILTRATION; INTRACAUDAL; PERINEURAL AS NEEDED
Status: DISCONTINUED | OUTPATIENT
Start: 2022-06-22 | End: 2022-06-22 | Stop reason: HOSPADM

## 2022-06-22 RX ORDER — LIDOCAINE HYDROCHLORIDE 20 MG/ML
INJECTION, SOLUTION EPIDURAL; INFILTRATION; INTRACAUDAL; PERINEURAL AS NEEDED
Status: DISCONTINUED | OUTPATIENT
Start: 2022-06-22 | End: 2022-06-22 | Stop reason: HOSPADM

## 2022-06-22 RX ORDER — SODIUM CHLORIDE, SODIUM LACTATE, POTASSIUM CHLORIDE, CALCIUM CHLORIDE 600; 310; 30; 20 MG/100ML; MG/100ML; MG/100ML; MG/100ML
100 INJECTION, SOLUTION INTRAVENOUS CONTINUOUS
Status: DISCONTINUED | OUTPATIENT
Start: 2022-06-22 | End: 2022-06-22 | Stop reason: HOSPADM

## 2022-06-22 RX ORDER — SODIUM CHLORIDE 0.9 % (FLUSH) 0.9 %
5-40 SYRINGE (ML) INJECTION EVERY 8 HOURS
Status: DISCONTINUED | OUTPATIENT
Start: 2022-06-22 | End: 2022-06-22 | Stop reason: HOSPADM

## 2022-06-22 RX ORDER — ROCURONIUM BROMIDE 10 MG/ML
INJECTION, SOLUTION INTRAVENOUS AS NEEDED
Status: DISCONTINUED | OUTPATIENT
Start: 2022-06-22 | End: 2022-06-22 | Stop reason: HOSPADM

## 2022-06-22 RX ORDER — SODIUM CHLORIDE 0.9 % (FLUSH) 0.9 %
5-40 SYRINGE (ML) INJECTION AS NEEDED
Status: DISCONTINUED | OUTPATIENT
Start: 2022-06-22 | End: 2022-06-22 | Stop reason: HOSPADM

## 2022-06-22 RX ORDER — HYDROCODONE BITARTRATE AND ACETAMINOPHEN 5; 325 MG/1; MG/1
1 TABLET ORAL
Qty: 12 TABLET | Refills: 0 | Status: SHIPPED | OUTPATIENT
Start: 2022-06-22 | End: 2022-06-25

## 2022-06-22 RX ADMIN — FENTANYL CITRATE 25 MCG: 50 INJECTION, SOLUTION INTRAMUSCULAR; INTRAVENOUS at 09:19

## 2022-06-22 RX ADMIN — ROCURONIUM BROMIDE 5 MG: 10 INJECTION INTRAVENOUS at 07:43

## 2022-06-22 RX ADMIN — Medication 3 MG: at 08:48

## 2022-06-22 RX ADMIN — FENTANYL CITRATE 25 MCG: 50 INJECTION, SOLUTION INTRAMUSCULAR; INTRAVENOUS at 09:20

## 2022-06-22 RX ADMIN — INDOCYANINE GREEN AND WATER 2.5 MG: KIT at 07:06

## 2022-06-22 RX ADMIN — CEFAZOLIN SODIUM 0.2 G: 10 INJECTION, POWDER, FOR SOLUTION INTRAVENOUS at 07:05

## 2022-06-22 RX ADMIN — ONDANSETRON HYDROCHLORIDE 4 MG: 2 SOLUTION INTRAMUSCULAR; INTRAVENOUS at 08:44

## 2022-06-22 RX ADMIN — LIDOCAINE HYDROCHLORIDE 80 MG: 20 INJECTION, SOLUTION EPIDURAL; INFILTRATION; INTRACAUDAL; PERINEURAL at 07:43

## 2022-06-22 RX ADMIN — SODIUM CHLORIDE, POTASSIUM CHLORIDE, SODIUM LACTATE AND CALCIUM CHLORIDE 100 ML/HR: 600; 310; 30; 20 INJECTION, SOLUTION INTRAVENOUS at 07:06

## 2022-06-22 RX ADMIN — GLYCOPYRROLATE 0.2 MG: 0.2 INJECTION INTRAMUSCULAR; INTRAVENOUS at 08:02

## 2022-06-22 RX ADMIN — ROCURONIUM BROMIDE 35 MG: 10 INJECTION INTRAVENOUS at 07:47

## 2022-06-22 RX ADMIN — HYDROCODONE BITARTRATE AND ACETAMINOPHEN 1 TABLET: 5; 325 TABLET ORAL at 09:52

## 2022-06-22 RX ADMIN — SODIUM CHLORIDE, POTASSIUM CHLORIDE, SODIUM LACTATE AND CALCIUM CHLORIDE: 600; 310; 30; 20 INJECTION, SOLUTION INTRAVENOUS at 07:02

## 2022-06-22 RX ADMIN — Medication 5 MG: at 08:37

## 2022-06-22 RX ADMIN — DEXAMETHASONE SODIUM PHOSPHATE 4 MG: 4 INJECTION, SOLUTION INTRAMUSCULAR; INTRAVENOUS at 07:52

## 2022-06-22 RX ADMIN — GLYCOPYRROLATE 0.4 MG: 0.2 INJECTION INTRAMUSCULAR; INTRAVENOUS at 08:47

## 2022-06-22 RX ADMIN — DIPHENHYDRAMINE HYDROCHLORIDE 12.5 MG: 50 INJECTION, SOLUTION INTRAMUSCULAR; INTRAVENOUS at 07:50

## 2022-06-22 RX ADMIN — ROCURONIUM BROMIDE 10 MG: 10 INJECTION INTRAVENOUS at 08:25

## 2022-06-22 RX ADMIN — PROPOFOL 150 MG: 10 INJECTION, EMULSION INTRAVENOUS at 07:43

## 2022-06-22 RX ADMIN — PROPOFOL 50 MG: 10 INJECTION, EMULSION INTRAVENOUS at 07:46

## 2022-06-22 RX ADMIN — FENTANYL CITRATE 25 MCG: 50 INJECTION, SOLUTION INTRAMUSCULAR; INTRAVENOUS at 07:43

## 2022-06-22 NOTE — ASSESSMENT & PLAN NOTE
Review of prevoius PCP records did show HR in the 50s, so mandy is not new  Home HR has been in the 40s  occl episodes of feeling \"fuzzy headed\" when walking around the store  Not as much in the past month  Feels better with rest   EKG with sinus bradycardia. Wonder if related to buspar as symptoms have been less in the past month and we decreased her buspar to once daily; however, she also wants to go back to BID due to increased anxiety  Given surgery tomorrow, rec waiting a week before increasing buspar to BID. If symptoms return, need to consider alternative treatment.  We also discussed EP referral, but she'd like to hold off for now  Consider checking thyroid with next labs

## 2022-06-22 NOTE — TELEPHONE ENCOUNTER
I have attempted to contact this patient by phone, no answer. Nurse lvm to advise of providers recommendations, and advised for a call back with any questions.

## 2022-06-22 NOTE — DISCHARGE INSTRUCTIONS
Patient Education        Gallbladder Removal Surgery: What to Expect at Home  Your Recovery  After your surgery, you will likely feel weak and tired for several days after you return home. Your belly may be swollen. You had laparoscopic surgery, so it's normal to also have some shoulder pain. This is caused by the air that your doctor put in your belly to help see your organs better. You may have gas or need to burp a lot at first.  A few people get diarrhea. The diarrhea usually goes away in 2 to 4 weeks, but it may last longer. How quickly you recover depends on whether you had a laparoscopic or open surgery. For a laparoscopic surgery, most people can go back to work or their normal routine in 1 to 2 weeks, but it may take longer, depending on the type of work you do. This care sheet gives you a general idea about how long it will take for you to recover; however, each person recovers at a different pace. Follow the steps below to get better as quickly as possible. How can you care for yourself at home? Activity    · Rest when you feel tired. Getting enough sleep will help you recover.     · Try to walk each day. Start out by walking a little more than you did the day before. Walking helps prevent blood clots in your legs and pneumonia.     · For about 1week, avoid lifting anything that would make you strain. This may include a child, heavy grocery bags and milk containers, a heavy briefcase or backpack, cat litter or dog food bags, or a vacuum .     · Avoid strenuous activities, such as biking, jogging, weightlifting, and aerobic exercise, until your doctor says it is okay.     · You may drive after 1 week and when no longer taking narcotic pain medication!     · For a laparoscopic surgery, most people can go back to work or their normal routine in 1 to 2 weeks, but it may take longer.      ·    Diet    · When you feel like eating, resume your normal diet.   If you have indigestion or persistent diarrhea, then you may want to avoid fatty foods like fried foods or cheese for a while. They can cause symptoms, such as diarrhea or bloating.     · Drink plenty of fluids (unless your doctor tells you not to).     · You may notice that your bowel movements are not regular right after your surgery. This is common. Try to avoid constipation and straining with bowel movements. You may want to take a fiber supplement every day. If you have not had a bowel movement by Thursday, 6/23, then take Miralax, Milk of Magnesia, prune juice, and other laxative or enema until your bowel movements are normal!!! Medicines    · You can restart your medicines. You will also be given instructions about taking any new medicines.     ·      · Be safe with medicines. Read and follow all instructions on the label. ? If the doctor gave you a prescription medicine for pain, take it as prescribed. ? If you are not taking a prescription pain medicine, ask your doctor if you can take an over-the-counter medicine. ? Do not take two or more pain medicines at the same time unless the doctor told you to. Many pain medicines contain acetaminophen, which is Tylenol. Too much Tylenol can be harmful.     · If you think your pain medicine is making you sick to your stomach:  ? Take your medicine after meals (unless your doctor tells you not to). ? Ask your doctor for a different pain medicine.     · If your doctor prescribed antibiotics, take them as directed. Do not stop taking them just because you feel better. You need to take the full course of antibiotics. Incision care    · Remove your bandages on Friday, 6/24. Leave the incisions uncovered. · You have strips of tape on the incisions the doctor made. Leave the tape on for a week and then remove them on Wednesday, 6/29!     · You may shower 24 hours after surgery. Pat the incisions dry.   Do not take a bath for the first 2 weeks, and until after seen by your doctor.     ·      · Keep the incisions clean and dry. You may cover them with a gauze bandage if they ooze fluid or rub against clothing. Change the bandage every day. Ice    · To reduce swelling and pain, put ice or a cold pack on your belly for 10 to 20 minutes at a time. Do this every 1 to 2 hours. Put a thin cloth between the ice and your skin. Follow-up care is a key part of your treatment and safety. Be sure to make and go to all appointments, and call your doctor if you are having problems. It's also a good idea to know your test results and keep a list of the medicines you take. When should you call for help? Call 911 anytime you think you may need emergency care. For example, call if:    · You passed out (lost consciousness).     · You are short of breath. .   Call your doctor now or seek immediate medical care if:    · You are sick to your stomach and cannot drink fluids.     · You have abdominal pain that does not get better when you take your pain medicine. · Your eyes turn jaundice (yellow).     · You cannot pass stool or gas.     · You have signs of infection, such as:  ? Increased pain, swelling, warmth, or redness. ? Red streaks leading from the incision. ? Pus draining from the incision. ? A fever > 101.     · Bright red blood has soaked through the bandage over your incision.     · You have loose stitches, or your incision comes open.     · You have signs of a blood clot in your leg (called a deep vein thrombosis), such as:  ? Pain in your calf, back of knee, thigh, or groin. ? Redness and swelling in your leg or groin. Watch closely for any changes in your health, and be sure to contact your doctor if you have any problems. Where can you learn more? Go to http://www.gray.com/  Enter F357 in the search box to learn more about \"Gallbladder Removal Surgery: What to Expect at Home. \"  Current as of: September 8, 2021               Content Version: 13.2  © 6137-9494 Healthwise, [employer]. Care instructions adapted under license by [employer] (which disclaims liability or warranty for this information). If you have questions about a medical condition or this instruction, always ask your healthcare professional. Eri Cote any warranty or liability for your use of this information. Jessi Teixeira. Errol Castellon MD, [address] [facility] at [address] [facility], [address] [place] [address]  Estefania MIGUELgenny DubonkathrynDignity Health [facility] 57  [phone]  Fax [phone]          DISCHARGE SUMMARY from your Nurse      PATIENT INSTRUCTIONS    After general anesthesia or intravenous sedation, for 24 hours or while taking prescription Narcotics:  · Limit your activities  · Do not drive and operate hazardous machinery  · Do not make important personal or business decisions  · Do  not drink alcoholic beverages  · If you have not urinated within 8 hours after discharge, please contact your surgeon on call. Report the following to your surgeon:  · Excessive pain, swelling, redness or odor of or around the surgical area  · Temperature over 100.5  · Nausea and vomiting lasting longer than 4 hours or if unable to take medications  · Any signs of decreased circulation or nerve impairment to extremity: change in color, persistent  numbness, tingling, coldness or increase pain  · Any questions      GOOD HELP TO FIGHT AN INFECTION  Here are a few tip to help reduce the chance of getting an infection after surgery:   Wash Your Hands   Good handwashing is the most important thing you and your caregiver can do.  Wash before and after caring for any wounds. Dry your hand with a clean towel.  Wash with soap and water for at least 20 seconds. A TIP: sing the \"Happy Birthday\" song through one time while washing to help with the timing.  Use a hand  in between washings.      Shower   When your surgeon says it is OK to take a shower, use a new bar of antibacterial soap (if that is what you use, and keep that bar of soap ONLY for your use), or antibacterial body wash.  Use a clean wash cloth or sponge when you bathe.  Dry off with a clean towel  after every bath - be careful around any wounds, skin staples, sutures or surgical glue over/on wounds.  Do not enter swimming pools, hot tubs, lakes, rivers and/or ocean until wounds are healed and your doctor/surgeon says it is OK.  Use Clean Sheets   Sleep on freshly laundered sheets after your surgery.  Keep the surgery site covered with a clean, dry bandage (if instructed to do so). If the bandage becomes soiled, reapply a new, dry, clean bandage.  Do not allow pets to sleep with you while your wound is healing.  Lifestyle Modification and Controlling Your Blood Sugar   Smoking slows wound healing. Stop smoking and limit exposure to second-hand smoke.  High blood sugar slows wound healing. Eat a well-balanced diet to provide proper nutrition while healing   Monitor your blood sugar (if you are a diabetic) and take your medications as you are suppose to so you can control you blood sugar after surgery. COUGH AND DEEP BREATHE    Breathing deeply and coughing are very important exercises to do after surgery. Deep breathing and coughing open the little air tubes and air sacks in your lungs. You take deep breaths every day. You may not even notice - it is just something you do when you sigh or yawn. It is a natural exercise you do to keep these air passages open. After surgery, take deep breaths and cough, on purpose. DIRECTIONS:  · Take 10 to 15 slow deep breaths every hour while awake. · Breathe in deeply, and hold it for 2 seconds. · Exhale slowly through puckered lips, like blowing up a balloon. · After every 4th or 5th deep breath, hug your pillow to your chest or belly and give a hard, deep cough. Yes, it will probably hurt. But doing this exercise is a very important part of healing after surgery. Take your pain medicine to help you do this exercise without too much pain. Coughing and deep breathing help prevent bronchitis and pneumonia after surgery. If you had chest or belly surgery, use a pillow as a \"hug diana\" and hold it tightly to your chest or belly when you cough. ANKLE PUMPS    Ankle pumps increase the circulation of oxygenated blood to your lower extremities and decrease your risk for circulation problems such as blood clots. They also stretch the muscles, tendons and ligaments in your foot and ankle, and prevent joint contracture in the ankle and foot, especially after surgeries on the legs. It is important to do ankle pump exercises regularly after surgery because immobility increases your risk for developing a blood clot. Your doctor may also have you take an Aspirin for the next few days as well. If your doctor did not ask you to take an Aspirin, consult with him before starting Aspirin therapy on your own. The exercise is quite simple. · Slowly point your foot forward, feeling the muscles on the top of your lower leg stretch, and hold this position for 5 seconds. · Next, pull your foot back toward you as far as possible, stretching the calf muscles, and hold that position for 5 seconds. · Repeat with the other foot. · Perform 10 repetitions every hour while awake for both ankles if possible (down and then up with the foot once is one repetition). You should feel gentle stretching of the muscles in your lower leg when doing this exercise. If you feel pain, or your range of motion is limited, don't push too hard. Only go the limit your joint and muscles will let you go. If you have increasing pain, progressively worsening leg warmth or swelling, STOP the exercise and call your doctor.            MEDICATION AND   SIDE EFFECT GUIDE    The [facility] MEDICATION AND SIDE EFFECT GUIDE was provided to the PATIENT AND CARE PROVIDER. Information provided includes instruction about drug purpose and common side effects for the following medications:   · [address]        These are general instructions for a healthy lifestyle:    *   Please give a list of your current medications to your Primary Care Provider. *   Please update this list whenever your medications are discontinued, doses are changed, or new medications (including over-the-counter products) are added. *   Please carry medication information at all times in case of emergency situations. About Smoking  No smoking / No tobacco products  Avoid exposure to second hand smoke     Surgeon General's Warning:  Quitting smoking now greatly reduces serious risk to your health. Obesity, smoking, and sedentary lifestyle greatly increases your risk for illness and disease. A healthy diet, regular physical exercise & weight monitoring are important for maintaining a healthy lifestyle. Congestive Heart Failure  You may be retaining fluid if you have a history of heart failure or if you experience any of the following symptoms:  Weight gain of 3 pounds or more overnight or 5 pounds in a week, increased swelling in your hands or feet or shortness of breath while lying flat in bed. Please call your doctor as soon as you notice any of these symptoms; do not wait until your next office visit. Recognize signs and symptoms of STROKE:  F -  Face looks uneven  A -  Arms unable to move or move evenly  S -  Speech slurred or non-existent  T -  Time-call 911 as soon as signs and symptoms begin-DO NOT go          back to bed or wait to see if you get better-TIME IS BRAIN. Warning Signs of HEART ATTACK   Call 911 if you have these symptoms:     Chest discomfort. Most heart attacks involve discomfort in the center of the chest that lasts more than a few minutes, or that goes away and comes back.  It can feel like uncomfortable pressure, squeezing, fullness, or pain.  Discomfort in other areas of the upper body. Symptoms can include pain or discomfort in one or both arms, the back, neck, jaw, or stomach.  Shortness of breath with or without chest discomfort.  Other signs may include breaking out in a cold sweat, nausea, or lightheadedness. Don't wait more than five minutes to call 911 - MINUTES MATTER! Fast action can save your life. Calling 911 is almost always the fastest way to get lifesaving treatment. Emergency Medical Services staff can begin treatment when they arrive -- up to an hour sooner than if someone gets to the hospital by car. Learning About Coronavirus [phone])  Coronavirus [phone]): Overview  What is coronavirus (COVID-19)? The coronavirus disease (COVID-19) is caused by a virus. It is an illness that was first found in [place], [place], in December 2019. It has since spread worldwide. The virus can cause fever, cough, and trouble breathing. In severe cases, it can cause pneumonia and make it hard to breathe without help. It can cause death. Coronaviruses are a large group of viruses. They cause the common cold. They also cause more serious illnesses like Middle East respiratory syndrome (MERS) and severe acute respiratory syndrome (SARS). COVID-19 is caused by a novel coronavirus. That means it's a new type that has not been seen in people before. This virus spreads person-to-person through droplets from coughing and sneezing. It can also spread when you are close to someone who is infected. And it can spread when you touch something that has the virus on it, such as a doorknob or a tabletop. What can you do to protect yourself from coronavirus (COVID-19)? The best way to protect yourself from getting sick is to:  · Avoid areas where there is an outbreak. · Avoid contact with people who may be infected. · Wash your hands often with soap or alcohol-based hand sanitizers.   · Avoid crowds and try to stay at least 6 feet away from other people. · Wash your hands often, especially after you cough or sneeze. Use soap and water, and scrub for at least 20 seconds. If soap and water aren't available, use an alcohol-based hand . · Avoid touching your mouth, nose, and eyes. What can you do to avoid spreading the virus to others? To help avoid spreading the virus to others:  · Cover your mouth with a tissue when you cough or sneeze. Then throw the tissue in the trash. · Use a disinfectant to clean things that you touch often. · Stay home if you are sick or have been exposed to the virus. Don't go to school, work, or public areas. And don't use public transportation. · If you are sick:  ? Leave your home only if you need to get medical care. But call the doctor's office first so they know you're coming. And wear a face mask, if you have one.  ? If you have a face mask, wear it whenever you're around other people. It can help stop the spread of the virus when you cough or sneeze. ? Clean and disinfect your home every day. Use household  and disinfectant wipes or sprays. Take special care to clean things that you grab with your hands. These include doorknobs, remote controls, phones, and handles on your refrigerator and microwave. And don't forget countertops, tabletops, bathrooms, and computer keyboards. When to call for help  Call 911 anytime you think you may need emergency care. For example, call if:  · You have severe trouble breathing. (You can't talk at all.)  · You have constant chest pain or pressure. · You are severely dizzy or lightheaded. · You are confused or can't think clearly. · Your face and lips have a blue color. · You pass out (lose consciousness) or are very hard to wake up. Call your doctor now if you develop symptoms such as:  · Shortness of breath. · Fever. · Cough.   If you need to get care, call ahead to the doctor's office for instructions before you go. Make sure you wear a face mask, if you have one, to prevent exposing other people to the virus. Where can you get the latest information? The following health organizations are tracking and studying this virus. Their websites contain the most up-to-date information. Candida Andrade also learn what to do if you think you may have been exposed to the virus. · U.S. Centers for Disease Control and Prevention (CDC): The CDC provides updated news about the disease and travel advice. The website also tells you how to prevent the spread of infection. www.cdc.gov  · World Health Organization [facility]): WHO offers information about the virus outbreaks. WHO also has travel advice. www.who.int  Current as of: April 1, 2020               Content Version: 12.4  © 2006-2020 Healthwise, [employer]. Care instructions adapted under license by your healthcare professional. If you have questions about a medical condition or this instruction, always ask your healthcare professional. [address] any warranty or liability for your use of this information. The discharge information has been reviewed with the {PATIENT PARENT GUARDIAN:73097}. Any questions and concerns from the {PATIENT PARENT GUARDIAN:44391} have been addressed. The {PATIENT PARENT GUARDIAN:81951} verbalized understanding.         CONTENTS FOUND IN YOUR DISCHARGE ENVELOPE:  [x]     Surgeon and Hospital Discharge Instructions  [x]     [facility] Care Provider Card  [x]     Medication & Side Effect Guide            (your newly prescribed medications have been marked/highlighted showing the most common side effects from   the classes of drugs on your prescriptions)  [x]     Medication Prescription(s) x *** ( [] These have been sent electronically to your pharmacy by your surgeon,   - OR -       your surgeon has already provided these to you during a previous/pre-op office visit)  []     [address]  []     Physical Therapy Prescription  []     Follow-up Appointment Cards  []     Surgery-related Pictures/Media  []     Pain block and/or block with On-Q Catheter from Anesthesia Service (information included in your instructions above)  []     Medical device information sheets/pamphlets from their    []     School/work excuse note. []     /parent work excuse note. The following personal items collected during your admission are returned to you:   Dental Appliance: Dental Appliances: None  Vision: Visual Aid: Glasses  Hearing Aid:    Jewelry: Jewelry: None  Clothing: Clothing: Other (comment) (In [place])  Other Valuables:  Other Valuables: Eyeglasses (PACU Desk)  Valuables sent to safe:

## 2022-06-22 NOTE — OP NOTES
Operative Report    Pily Beckham    MRN:  [id number]. Date of Surgery:   6/22/2022     Surgeon:  Princess Jose A MD.      Assistant:    1. Dayanna Ewing. 2. Corey Ritter. Anesthesia:   1. General endotracheal.  2.  0.5% Marcaine. Preoperative Diagnosis:   CHRONIC CALCULOUS CHOLECYSTITIS. Postoperative Diagnosis:   CHRONIC CALCULOUS CHOLECYSTITIS. Procedure:   Robotic-assisted laparoscopic cholecystectomy with firefly. Indication:   Pily Beckham is a [de-identified] yrs white female with a history of postprandial right upper quadrant pain for the past year. Her ultrasound reveals gallstones. Procedure in Detail:  The patient was seen preoperatively in the holding area. The risks, benefits, and expected outcomes were discussed with the patient, and all questions were answered satisfactorily. The patient concurred with the proposed plan, giving informed consent. The patient was injected with IC green intravenously in the holding area approximately 1 hour prior to the procedure start time. The patient was taken to the Operating Room. The patient was identified as Pily Beckham, and the procedure verified as Robotic Assisted Laparoscopic Cholecystectomy with Firefly, possible Intraoperative Cholangiogram, possible open. The patient was placed on the OR table in the supine position. Prior to the induction of anesthesia, antibiotic prophylaxis was administered. General endotracheal anesthesia was administered and tolerated well. The patient's abdomen was prepped with Chloraprep and draped in the usual sterile fashion. A Time Out was performed, and the above information was confirmed. Using a 15 blade, a transverse incision was made above the umbilicus after injecting the local anesthetic. The abdominal wall was elevated with towel clips. Using the optiview technique, an 8 mm trocar was introduced into the abdominal cavity.   Insufflation was provided through this trocar to establish a pneumoperitoneum of 15 mmHg which the patient tolerated. The camera was inserted through the trocar. The RUQ was visualized, and there were no adhesions noted to prevent a laparoscopic approach. The local anesthetic was injected at all intended trocar sites. Two 8 mm trocars were placed approximately 10 cm lateral to the camera port on either side. A 5 mm trocar was placed in the RLQ along the anterior axillary line for the assistant. The patient was positioned in reverse Trendelenburg and rotated slightly toward the left. There were some adhesions to the umbilicus which were taken down with cautery and scissors. The robotic arms were docked. At this time, I scrubbed out and went to the surgeon console. I took control of the robotic arms for the major portion of the procedure. Attention was turned to the right upper quadrant. The liver appeared nodular. The fundus of the gallbladder was grasped and retracted over the dome of the liver. There were a few filmy adhesions to the gallbladder which were taken down with blunt dissection and cautery. The gallbladder appeared large, but otherwise grossly normal.  The infundibulum was grasped and retracted laterally. Using blunt dissection and cautery, the cystic duct was carefully dissected out and clearly visualized entering the gallbladder. The biliary anatomy was confirmed with firefly. The cystic artery was identified posterior to this within Calot's triangle. It was dissected out and clearly visualized entering the gallbladder as well. Once the critical view was obtained, the cystic duct was clipped twice proximally and once distally with Hemolock clips and divided with the endoscissors. The cystic artery was clipped and divided in similar fashion. Traction was then placed on the gallbladder as it was carefully dissected from the liver bed in retrograde fashion with cautery. Hemostasis was obtained along the liver bed as needed.   Prior to removing the gallbladder, the RUQ was inspected. The clips along the cystic duct were in place with no evidence of any bile leakage, and the clips along the cystic artery were in place with no evidence of any bleeding. The supraumbilical trocar was up-sized to a 12 mm trocar. The gallbladder was retrieved intact via an Endocatch bag and pulled up to the supraumbilical incision. At this time, I scrubbed back in and went to the patient's bedside. The fascia was dilated with a large Marisela clamp, allowing the gallbladder to be retrieved. Multiple small stones were palpated within the gallbladder. The gallbladder was passed off as a specimen. The fascia at the supraumbilical incision was closed with a 0-0 Vicryl using the Crossbow device. The LUQ and RLQ trocars were removed under direct laparoscopic visualization, and there was no bleeding noted from either site. The laparoscope was removed, and the abdomen was decompressed. The RUQ trocar was then removed. Hemostasis was obtained within the wounds as needed with cautery. The skin at all sites was approximated with 4-0 Monocryl in the usual subcuticular fashion. The wounds were cleaned and dried, and steri-strips and Bandaids were applied. The patient was extubated in the room. Estimated Blood Loss:  Less than 25 ml. macario Gibson Specimen:   ID Type Source Tests Collected by Time Destination   1 : Gallbladder Preservative Gallbladder  Lisbeth Jin MD 6/22/2022 1088 Pathology               Implants:  None. Findings:   1. A nodular liver. 2.  A few filmy adhesions to a large, but otherwise grossly normal-appearing gallbladder. 3.  Multiple small gallstones. Counts: All sponge, needle, and instrument counts were correct x 2. Complications:    None. Disposition:   The patient was transferred to the recovery room in stable condition, having tolerated the procedure and anesthesia well.         Signed By: Bella Duarte MD June 22, 2022            CC: Rosa Maria Estrada MD

## 2022-06-22 NOTE — ANESTHESIA POSTPROCEDURE EVALUATION
Procedure(s):  ROBOTIC ASSISTED LAPAROSCOPIC CHOLECYSTECTOMY WITH FIREFLY, POSSIBLE OPEN. general    Anesthesia Post Evaluation      Multimodal analgesia: multimodal analgesia not used between 6 hours prior to anesthesia start to PACU discharge  Patient location during evaluation: PACU  Patient participation: complete - patient participated  Level of consciousness: awake  Pain management: adequate  Airway patency: patent  Anesthetic complications: no  Cardiovascular status: acceptable, blood pressure returned to baseline and hemodynamically stable  Respiratory status: acceptable  Hydration status: acceptable  Post anesthesia nausea and vomiting:  controlled      INITIAL Post-op Vital signs:   Vitals Value Taken Time   /46 06/22/22 1000   Temp 36.6 °C (97.8 °F) 06/22/22 0920   Pulse 52 06/22/22 1001   Resp 10 06/22/22 1001   SpO2 99 % 06/22/22 1001   Vitals shown include unvalidated device data.

## 2022-06-22 NOTE — INTERVAL H&P NOTE
Update History & Physical    The Patient's History and Physical of June 7,   The plan was reviewed with the patient and I examined the patient. There was no change. The surgical site was confirmed by the patient and me. Plan:  The risk, benefits, expected outcome, and alternative to the recommended procedure have been discussed with the patient. Patient understands and wants to proceed with the procedure.     Electronically signed by Donnell Floyd MD on 6/22/2022 at 7:27 AM

## 2022-06-22 NOTE — ASSESSMENT & PLAN NOTE
Worsening left shoulder pain and decreased ROM  S/p 1 year ago shoulder replacement  Check xray, rec PT.  If no improvement, discussed ortho referral (had surgery out of town)

## 2022-06-22 NOTE — ANESTHESIA PREPROCEDURE EVALUATION
Relevant Problems   CARDIOVASCULAR   (+) Primary hypertension      GASTROINTESTINAL   (+) Fatty liver      RENAL FAILURE   (+) Renal cyst   (+) Stage 3 chronic kidney disease (HCC)      ENDOCRINE   (+) Arthritis      PERSONAL HX & FAMILY HX OF CANCER   (+) Basal cell carcinoma (BCC), unspecified site       Anesthetic History   No history of anesthetic complications            Review of Systems / Medical History  Patient summary reviewed and pertinent labs reviewed    Pulmonary  Within defined limits                 Neuro/Psych         Psychiatric history     Cardiovascular    Hypertension                   GI/Hepatic/Renal         Renal disease: CRI  Liver disease    Comments: Fatty liver Endo/Other        Arthritis     Other Findings              Physical Exam    Airway  Mallampati: III  TM Distance: 4 - 6 cm  Neck ROM: normal range of motion   Mouth opening: Normal     Cardiovascular  Regular rate and rhythm,  S1 and S2 normal,  no murmur, click, rub, or gallop  Rhythm: regular  Rate: normal         Dental  No notable dental hx       Pulmonary  Breath sounds clear to auscultation               Abdominal  GI exam deferred       Other Findings            Anesthetic Plan    ASA: 2  Anesthesia type: general          Induction: Intravenous  Anesthetic plan and risks discussed with: Patient

## 2022-06-22 NOTE — ASSESSMENT & PLAN NOTE
Noted incidentally on 5/2022 US for RUQ pain; spoke to rads - rec 6-12mo follow up [address]

## 2022-06-22 NOTE — TELEPHONE ENCOUNTER
----- Message from Tabitha Moore MD sent at 2/72/7242  5:27 PM EDT -----  Please call the pt - xray without acute issues. Rec PT for shoulder pain as we discussed.

## 2022-06-22 NOTE — ASSESSMENT & PLAN NOTE
Pt wanted to decrease buspar to daily last visit but notes increased symptoms and wants to increase  Also with bradycardia, unclear if buspar playing a role especially given improvement in lightheadedness with lower dose of buspar. Has cholecystectomy tomorrow - rec waiting a week before increasing buspar back two twice a day. If symptoms return, willl need to consider alternatives.   She has not been on anything else for anxiety

## 2022-06-22 NOTE — ASSESSMENT & PLAN NOTE
Was switched from 7.5mg to 5mg for ease of administration. BP at home running 130s-160s at home  rec increasing to 10mg amlodipine instead.  Cont benazepril 40   Monitor for lightheadedness

## 2022-06-23 ENCOUNTER — TELEPHONE (OUTPATIENT)
Dept: SURGERY | Age: 82
End: 2022-06-23

## 2022-06-23 NOTE — TELEPHONE ENCOUNTER
Pt is 1 day postop Robotic-assisted laparoscopic cholecystectomy with firefly. Pt was called but went to . Left message to call the office back.

## 2022-06-23 NOTE — TELEPHONE ENCOUNTER
Returned call to patient and verified using 2 patient identifiers. How are you doing: Pt reports that she is not having any incisional pain mainly gas pains mainly left side of abdomen area and under her ribcage. Rec to increase her walking- that discomfort does get better over time. She reports that she is doing the deep breathing exercises and ankle/foot pumps. Are you having any pain: Yes __x___           No ______  If yes level:7/10    Are you taking pain meds:Yes       If yes- recommended any OTC constipation treatment if needed    Have you had any nausea or vomiting: Yes _______      No ___x____    How is your appetite (eating & drinking):reports eating light and drinking without problems    Have you moved your bowels since surgery: Yes ______       No ___x___    Any issues with urination: Yes _____        No __x____    Pt notified to take dressing off after 48 hrs: Yes ___x____     No ______aware to remove the steri-strips in 7 days. Do they have a drain:  Yes ______No ___x___    Are they keeping track of output: Yes ______     No ______n/a    Did they review their discharge instructions:  Yes ______   No __x____pt states that she skimmed over them but has not read it entirely. Rec that she read them when she can. Pt voiced understandings. Any other concerns:Question about having a sore throat. Informed her that she can try throat lozenges, cough drops or warm tea. Pt is to call the office back with any questions or concerns.       Your follow up office appt is:07/06 at 1:10 pm

## 2022-07-06 ENCOUNTER — TELEPHONE (OUTPATIENT)
Dept: INTERNAL MEDICINE CLINIC | Age: 82
End: 2022-07-06

## 2022-07-06 ENCOUNTER — OFFICE VISIT (OUTPATIENT)
Dept: SURGERY | Age: 82
End: 2022-07-06
Payer: MEDICARE

## 2022-07-06 VITALS
SYSTOLIC BLOOD PRESSURE: 135 MMHG | OXYGEN SATURATION: 98 % | DIASTOLIC BLOOD PRESSURE: 50 MMHG | HEART RATE: 58 BPM | HEIGHT: 64 IN | BODY MASS INDEX: 30.73 KG/M2 | WEIGHT: 180 LBS | RESPIRATION RATE: 18 BRPM | TEMPERATURE: 98.1 F

## 2022-07-06 DIAGNOSIS — N32.81 OVERACTIVE BLADDER: Primary | ICD-10-CM

## 2022-07-06 DIAGNOSIS — N32.81 OVERACTIVE BLADDER: ICD-10-CM

## 2022-07-06 DIAGNOSIS — Z90.49 S/P LAPAROSCOPIC CHOLECYSTECTOMY: ICD-10-CM

## 2022-07-06 PROBLEM — K80.10 CHRONIC CALCULOUS CHOLECYSTITIS: Status: RESOLVED | Noted: 2022-06-07 | Resolved: 2022-07-06

## 2022-07-06 PROCEDURE — 99024 POSTOP FOLLOW-UP VISIT: CPT | Performed by: SURGERY

## 2022-07-06 RX ORDER — SOLIFENACIN SUCCINATE 5 MG/1
5 TABLET, FILM COATED ORAL DAILY
Qty: 90 TABLET | Refills: 1 | Status: SHIPPED | OUTPATIENT
Start: 2022-07-06 | End: 2022-07-06 | Stop reason: SDUPTHER

## 2022-07-06 NOTE — PROGRESS NOTES
1. Have you been to the ER, urgent care clinic since your last visit? Hospitalized since your last visit? No    2. Have you seen or consulted any other health care providers outside of the [address] [place] [address] since your last visit? Include any pap smears or colon screening.  No

## 2022-07-06 NOTE — TELEPHONE ENCOUNTER
Nurse advised patient of medication sent to pharmacy. Patient would like medication resent to mail delivery pharmacy. Request sent to provider.

## 2022-07-06 NOTE — PROGRESS NOTES
Subjective:      Alberto Rodríguez is a [de-identified] y.o. white female presents for postop care 2 weeks following a lap michael. Mrs. Terrell Mauro is doing fine. Her appetite is good, and she is eating a regular diet without difficulty. Her bowel movements are alternating constipation and regularity. She is not having any pain or problems with her incisions. Objective:     Visit Vitals  BP (!) 135/50 (BP 1 Location: Right upper arm, BP Patient Position: Sitting, BP Cuff Size: Adult)   Pulse (!) 58   Temp 98.1 °F (36.7 °C) (Oral)   Resp 18   Ht 5' 4\" (1.626 m)   Wt 180 lb (81.6 kg)   SpO2 98%   BMI 30.90 kg/m²       General:  alert, cooperative, no distress   Abdomen:  Soft, NT, ND. The incisions are clean, dry, and intact with no erythema. HEENT:  Sclerae are anicteric. Assessment:     1st POV, s/p robot lap michael. Plan:     The pathology report was discussed with Mrs. Fuchs. She is doing well and can f/u prn. I have recommended that she take something daily for constipation until it resolves.

## 2022-07-07 RX ORDER — SOLIFENACIN SUCCINATE 5 MG/1
5 TABLET, FILM COATED ORAL DAILY
Qty: 90 TABLET | Refills: 1 | Status: SHIPPED | OUTPATIENT
Start: 2022-07-07

## 2022-07-22 ENCOUNTER — TELEPHONE (OUTPATIENT)
Dept: INTERNAL MEDICINE CLINIC | Age: 82
End: 2022-07-22

## 2022-07-28 RX ORDER — BENAZEPRIL HYDROCHLORIDE 40 MG/1
40 TABLET ORAL EVERY MORNING
Qty: 90 TABLET | Refills: 3 | Status: SHIPPED | OUTPATIENT
Start: 2022-07-28 | End: 2022-08-01 | Stop reason: SDUPTHER

## 2022-08-02 RX ORDER — BENAZEPRIL HYDROCHLORIDE 40 MG/1
40 TABLET ORAL EVERY MORNING
Qty: 90 TABLET | Refills: 3 | Status: SHIPPED | OUTPATIENT
Start: 2022-08-02

## 2022-08-23 ENCOUNTER — TELEPHONE (OUTPATIENT)
Dept: ENT CLINIC | Age: 82
End: 2022-08-23

## 2022-08-23 NOTE — TELEPHONE ENCOUNTER
Pt called and lvm stating she was returning alon's called in regards to an appt. I called the pt back and let her know that Alon was just informing her that Dr. Alba Nunez was referring her out for a hearing test in [place] and provided the office's phone number for her to call and get scheduled and then I also told her to call our office back with any questions/concerns.

## 2022-09-22 ENCOUNTER — TELEPHONE (OUTPATIENT)
Dept: INTERNAL MEDICINE CLINIC | Age: 82
End: 2022-09-22

## 2022-09-22 NOTE — TELEPHONE ENCOUNTER
Patient called to state she had a fall recently and was advised to reach out to her PCP and let her know this. Patient would like a call back from our office to advise if she needs to be seen or not and if any x-rays are necessary.

## 2022-09-22 NOTE — TELEPHONE ENCOUNTER
I have attempted to contact this patient by phone, no answer. Nurse lvm to advise to discuss concerns and fall.

## 2022-09-22 NOTE — TELEPHONE ENCOUNTER
Patient stated that last Friday she fell on bathroom floor hurting right side of rib no external bruising but patient is having discomfort and could not complete PT c/o pain while taking a deep breath. Nurse assisted patient in making an appointment with pcp for evaluation.

## 2022-09-23 ENCOUNTER — OFFICE VISIT (OUTPATIENT)
Dept: INTERNAL MEDICINE CLINIC | Age: 82
End: 2022-09-23
Attending: INTERNAL MEDICINE
Payer: MEDICARE

## 2022-09-23 ENCOUNTER — HOSPITAL ENCOUNTER (OUTPATIENT)
Dept: GENERAL RADIOLOGY | Age: 82
Discharge: HOME | End: 2022-09-23
Attending: INTERNAL MEDICINE
Payer: MEDICARE

## 2022-09-23 ENCOUNTER — TELEPHONE (OUTPATIENT)
Dept: INTERNAL MEDICINE CLINIC | Age: 82
End: 2022-09-23

## 2022-09-23 VITALS
OXYGEN SATURATION: 99 % | BODY MASS INDEX: 30.05 KG/M2 | WEIGHT: 176 LBS | HEIGHT: 64 IN | SYSTOLIC BLOOD PRESSURE: 122 MMHG | RESPIRATION RATE: 18 BRPM | HEART RATE: 54 BPM | DIASTOLIC BLOOD PRESSURE: 75 MMHG

## 2022-09-23 DIAGNOSIS — N32.81 OVERACTIVE BLADDER: ICD-10-CM

## 2022-09-23 DIAGNOSIS — Z12.31 ENCOUNTER FOR SCREENING MAMMOGRAM FOR MALIGNANT NEOPLASM OF BREAST: ICD-10-CM

## 2022-09-23 DIAGNOSIS — G89.29 CHRONIC LEFT SHOULDER PAIN: ICD-10-CM

## 2022-09-23 DIAGNOSIS — R07.81 RIB PAIN ON RIGHT SIDE: Primary | ICD-10-CM

## 2022-09-23 DIAGNOSIS — N18.30 STAGE 3 CHRONIC KIDNEY DISEASE, UNSPECIFIED WHETHER STAGE 3A OR 3B CKD (HCC): ICD-10-CM

## 2022-09-23 DIAGNOSIS — I10 PRIMARY HYPERTENSION: ICD-10-CM

## 2022-09-23 DIAGNOSIS — R07.81 RIB PAIN ON RIGHT SIDE: ICD-10-CM

## 2022-09-23 DIAGNOSIS — M25.512 CHRONIC LEFT SHOULDER PAIN: ICD-10-CM

## 2022-09-23 PROCEDURE — G8536 NO DOC ELDER MAL SCRN: HCPCS | Performed by: INTERNAL MEDICINE

## 2022-09-23 PROCEDURE — G8510 SCR DEP NEG, NO PLAN REQD: HCPCS | Performed by: INTERNAL MEDICINE

## 2022-09-23 PROCEDURE — G8399 PT W/DXA RESULTS DOCUMENT: HCPCS | Performed by: INTERNAL MEDICINE

## 2022-09-23 PROCEDURE — 1090F PRES/ABSN URINE INCON ASSESS: CPT | Performed by: INTERNAL MEDICINE

## 2022-09-23 PROCEDURE — G8417 CALC BMI ABV UP PARAM F/U: HCPCS | Performed by: INTERNAL MEDICINE

## 2022-09-23 PROCEDURE — 99214 OFFICE O/P EST MOD 30 MIN: CPT | Performed by: INTERNAL MEDICINE

## 2022-09-23 PROCEDURE — 1101F PT FALLS ASSESS-DOCD LE1/YR: CPT | Performed by: INTERNAL MEDICINE

## 2022-09-23 PROCEDURE — G8752 SYS BP LESS 140: HCPCS | Performed by: INTERNAL MEDICINE

## 2022-09-23 PROCEDURE — G8754 DIAS BP LESS 90: HCPCS | Performed by: INTERNAL MEDICINE

## 2022-09-23 PROCEDURE — 71101 X-RAY EXAM UNILAT RIBS/CHEST: CPT

## 2022-09-23 PROCEDURE — G8427 DOCREV CUR MEDS BY ELIG CLIN: HCPCS | Performed by: INTERNAL MEDICINE

## 2022-09-23 NOTE — PROGRESS NOTES
Note   Chief Complaint   Rib pain    Annette Pelaez is a [de-identified] y.o. female     1. Rib pain on right side  Assessment & Plan:  Fall last week  Tripped over threshold ather bathroom  Fell onto the floor  No LOC  Hit her right side, broke a finger nail   Was able to get herself up after a while   Dull ache still with occl sharp pain with certain movements   Right lateral posterior back   Hurts to take a deep breath   No fevers, chills  Recommend checking x-ray. Continue Tylenol as needed for pain. Can also use lidocaine patches  Orders:  -     XR RIBS RT W PA CXR MIN 3 V; Future  2. Encounter for screening mammogram for malignant neoplasm of breast  -     GRECIA 3D JOSE W MAMMO BI SCREENING INCL CAD; Future  3. Stage 3 chronic kidney disease, unspecified whether stage 3a or 3b CKD ([address]  Assessment & Plan:  Stable, monitor  4. Chronic left shoulder pain  Assessment & Plan:  Better though still present  Doing PT  Not able to do as much exercisese given recent fall, see that note   5. Primary hypertension  Assessment & Plan:   well controlled, continue current medications   Amlodipine 10, benazepril 40. Also takes Lasix 3 times a week  No headedness or dizziness  6. Overactive bladder  Assessment & Plan:  Switched from Detrol to [employer]Eye due to [facility] working well. Continue Vesicare 5 mg daily     Benefits, risks, possible drug interactions, and side effects of all new medications were reviewed with the patient. Pt verbalized understanding. Return to clinic: 4 months for Medicare wellness  Moved from [facility] near [place] 12/2021  Daughter and son in law live here     An electronic signature was used to authenticate this note. Elizabeth Khan MD  [facility]  9/26/2022    No future appointments.        Objective   Vitals:       Visit Vitals  /75 (BP 1 Location: Left upper arm, BP Patient Position: Sitting, BP Cuff Size: Large adult)   Pulse (!) 54   Resp 18   Ht 5' 4\" (1.626 m)   Wt 176 lb (79.8 kg)   SpO2 99%   BMI 30.21 kg/m²        Physical Exam  Constitutional:       Appearance: Normal appearance. She is not ill-appearing. Cardiovascular:      Rate and Rhythm: Normal rate and regular rhythm. Heart sounds: No murmur heard. No friction rub. No gallop. Comments: Tenderness to right lower ribs  Pulmonary:      Effort: No respiratory distress. Breath sounds: Normal breath sounds. No wheezing, rhonchi or rales. Neurological:      Mental Status: She is alert. Current Outpatient Medications   Medication Sig    benazepriL (LOTENSIN) 40 mg tablet Take 1 Tablet by mouth Every morning. Indications: high blood pressure    solifenacin (VESICARE) 5 mg tablet Take 1 Tablet by mouth daily. Indications: overactive bladder    busPIRone (BUSPAR) 5 mg tablet Take 1 Tablet by mouth two (2) times a day. Indications: anxiety    amLODIPine (NORVASC) 10 mg tablet Take 1 Tablet by mouth daily. Indications: high blood pressure    psyllium husk (METAMUCIL PO) Take 3 Gum by mouth two (2) times a day. loratadine (CLARITIN) 10 mg tablet Take 10 mg by mouth Every morning. acetaminophen (TYLENOL ARTHRITIS PO) Take 1 Tablet by mouth two (2) times a day. 650 bid    biotin 10,000 mcg cap Take 1 Tablet by mouth every evening.    multivitamin (ONE A DAY) tablet Take 1 Tablet by mouth every evening. ascorbic acid, vitamin C, (VITAMIN C) 500 mg tablet Take 500 mg by mouth two (2) times a day. furosemide (LASIX) 20 mg tablet Take 1 Tablet by mouth every Monday, Wednesday, Friday. No current facility-administered medications for this visit.

## 2022-09-23 NOTE — PROGRESS NOTES
Please call the pt - xray negative for fracture. Rec lidocaine patches. Could also try voltaren gel over the counter for pain. Continue tylenol as needed.   Call if no improvement

## 2022-09-23 NOTE — PROGRESS NOTES
Rm    No chief complaint on file. Visit Vitals  /75 (BP 1 Location: Left upper arm, BP Patient Position: Sitting, BP Cuff Size: Large adult)   Pulse (!) 54   Resp 18   Ht 5' 4\" (1.626 m)   Wt 176 lb (79.8 kg)   SpO2 99%   BMI 30.21 kg/m²        1. Have you been to the ER, urgent care clinic since your last visit? Hospitalized since your last visit? No    2. Have you seen or consulted any other health care providers outside of the [address] [place] [address] since your last visit? Include any pap smears or colon screening. No     Health Maintenance Due   Topic Date Due    DTaP/Tdap/Td series (1 - Tdap) Never done    Shingrix Vaccine Age 50> (1 of 2) Never done    Pneumococcal 65+ years (1 - PCV) Never done    Medicare Yearly Exam  Never done    COVID-19 Vaccine (4 - Booster for Moderna series) 02/27/2022    Flu Vaccine (1) Never done        3 most recent PHQ Screens 9/23/2022   Little interest or pleasure in doing things Not at all   Feeling down, depressed, irritable, or hopeless Not at all   Total Score PHQ 2 0        Fall Risk Assessment, last 12 mths 9/23/2022   Able to walk? -   Fall in past 12 months? 1   Do you feel unsteady? 0   Are you worried about falling 0   Is TUG test greater than 12 seconds? (No Data)   Is the gait abnormal? 0   Number of falls in past 12 months 1   Fall with injury? 1       No flowsheet data found.

## 2022-09-23 NOTE — TELEPHONE ENCOUNTER
Yes, can get booster and flu (and can get both together if she wants although I would do it on a day where she can rest the next day if she needs to)

## 2022-09-23 NOTE — TELEPHONE ENCOUNTER
She's only had two booster shots and she hasn't gotten the flu shot yet. She wants to know if she should get the third booster shot and if she should get the flu shot. Please call when next available. Thank you.

## 2022-09-23 NOTE — TELEPHONE ENCOUNTER
----- Message from Contreras Daniels MD sent at 3/20/1212  3:51 PM EDT -----  Please call the pt - xray negative for fracture. Rec lidocaine patches. Could also try voltaren gel over the counter for pain. Continue tylenol as needed.   Call if no improvement

## 2022-09-26 PROBLEM — R07.81 RIB PAIN ON RIGHT SIDE: Status: ACTIVE | Noted: 2022-09-26

## 2022-09-27 NOTE — ASSESSMENT & PLAN NOTE
Better though still present  Doing PT  Not able to do as much exercisese given recent fall, see that note

## 2022-09-27 NOTE — ASSESSMENT & PLAN NOTE
Fall last week  Tripped over threshold [place]her [place] onto the floor  No LOC  Hit her right side, broke a finger nail   Was able to get herself up after a while   Dull ache still with occl sharp pain with certain movements   Right lateral posterior back   Hurts to take a deep breath   No fevers, chills  Recommend checking x-ray. Continue Tylenol as needed for pain.   Can also use lidocaine patches

## 2022-09-27 NOTE — ASSESSMENT & PLAN NOTE
well controlled, continue current medications   Amlodipine 10, benazepril 40.   Also takes Lasix 3 times a week  No headedness or dizziness

## 2022-10-04 ENCOUNTER — OFFICE VISIT (OUTPATIENT)
Dept: INTERNAL MEDICINE CLINIC | Age: 82
End: 2022-10-04
Payer: MEDICARE

## 2022-10-04 VITALS
DIASTOLIC BLOOD PRESSURE: 71 MMHG | SYSTOLIC BLOOD PRESSURE: 127 MMHG | OXYGEN SATURATION: 96 % | HEIGHT: 64 IN | RESPIRATION RATE: 12 BRPM | TEMPERATURE: 97.3 F | HEART RATE: 58 BPM | WEIGHT: 175 LBS | BODY MASS INDEX: 29.88 KG/M2

## 2022-10-04 DIAGNOSIS — R07.81 RIB PAIN ON RIGHT SIDE: ICD-10-CM

## 2022-10-04 DIAGNOSIS — R00.1 BRADYCARDIA: ICD-10-CM

## 2022-10-04 DIAGNOSIS — Z12.11 SCREENING FOR COLON CANCER: ICD-10-CM

## 2022-10-04 DIAGNOSIS — F41.9 ANXIETY: ICD-10-CM

## 2022-10-04 DIAGNOSIS — R41.3 MEMORY LOSS: Primary | ICD-10-CM

## 2022-10-04 DIAGNOSIS — Z00.00 ROUTINE ADULT HEALTH MAINTENANCE: ICD-10-CM

## 2022-10-04 PROCEDURE — G8510 SCR DEP NEG, NO PLAN REQD: HCPCS | Performed by: INTERNAL MEDICINE

## 2022-10-04 PROCEDURE — G8399 PT W/DXA RESULTS DOCUMENT: HCPCS | Performed by: INTERNAL MEDICINE

## 2022-10-04 PROCEDURE — G8754 DIAS BP LESS 90: HCPCS | Performed by: INTERNAL MEDICINE

## 2022-10-04 PROCEDURE — G8427 DOCREV CUR MEDS BY ELIG CLIN: HCPCS | Performed by: INTERNAL MEDICINE

## 2022-10-04 PROCEDURE — 1090F PRES/ABSN URINE INCON ASSESS: CPT | Performed by: INTERNAL MEDICINE

## 2022-10-04 PROCEDURE — G8752 SYS BP LESS 140: HCPCS | Performed by: INTERNAL MEDICINE

## 2022-10-04 PROCEDURE — G8417 CALC BMI ABV UP PARAM F/U: HCPCS | Performed by: INTERNAL MEDICINE

## 2022-10-04 PROCEDURE — 99214 OFFICE O/P EST MOD 30 MIN: CPT | Performed by: INTERNAL MEDICINE

## 2022-10-04 PROCEDURE — G8536 NO DOC ELDER MAL SCRN: HCPCS | Performed by: INTERNAL MEDICINE

## 2022-10-04 PROCEDURE — 1101F PT FALLS ASSESS-DOCD LE1/YR: CPT | Performed by: INTERNAL MEDICINE

## 2022-10-04 NOTE — ASSESSMENT & PLAN NOTE
Fuzzy feeling - happens if she gets up too quickly but otherwise doing okay  She increased her buspar to BID and hasn't seen a significant difference in symptoms  Previous EKG with sinus mandy   Check thyroid, monitor, pt defers further workup

## 2022-10-04 NOTE — ASSESSMENT & PLAN NOTE
Forgetting words, names   Has not misplaced things or gotten lost   Still able to do ADLs; daughter helps with setting up pill box   MOCA 25/30      Not interested in more formal testing/meds at this time   Advised to stay mentally and physically active  Check thyroid studies, previous labs [id number] unrevealing

## 2022-10-04 NOTE — ASSESSMENT & PLAN NOTE
well controlled, continue current medications   Buspar BID working better   Dizziness not significant currently

## 2022-10-04 NOTE — PROGRESS NOTES
Note   Chief Complaint   Memory test    Elsa Greene is a [de-identified] y.o. female     1. Memory loss  Assessment & Plan:  Forgetting words, names   Has not misplaced things or gotten lost   Still able to do ADLs; daughter helps with setting up pill box   MOCA 25/30      Not interested in more formal testing/meds at this time   Advised to stay mentally and physically active  Check thyroid studies, previous labs [id number] unrevealing  Orders:  -     TSH 3RD GENERATION; Future  -     T4, FREE; Future  -     T3 TOTAL; Future  2. Screening for colon cancer  -     REFERRAL TO GASTROENTEROLOGY  3. Anxiety  Assessment & Plan:   well controlled, continue current medications   Buspar BID working better   Dizziness not significant currently  4. Bradycardia  Assessment & Plan:   Fuzzy feeling - happens if she gets up too quickly but otherwise doing okay  She increased her buspar to BID and hasn't seen a significant difference in symptoms  Previous EKG with sinus mandy   Check thyroid, monitor, pt defers further workup  5. Rib pain on right side  Assessment & Plan:   improving  6. Routine adult health maintenance  Assessment & Plan:   History of colon polyps, reports last colonoscopy 5 years ago and wondering if she needs another one. Discussed current guidelines. She's not sure if she would want to treat colon cancer if found. She'll discuss with her daughter. Referral provided if she is interested in getting another colonoscopy done       Benefits, risks, possible drug interactions, and side effects of all new medications were reviewed with the patient. Pt verbalized understanding. Return to clinic:  as [facility] near [address] [place] [address] 12/2021  Daughter and son in law live here     An electronic signature was used to authenticate this note.   Janie Turpin MD  [facility]  10/4/2022    Future Appointments   Date Time Provider Nam Sylvester   4/4/2023  1:30 PM Zona Riedel, MD [facility]      On this date 10/04/2022 I have spent 38 minutes reviewing previous notes, test results and face to face with the patient discussing the diagnosis and importance of compliance with the treatment plan as well as documenting on the day of the visit. Objective   Vitals:       Visit Vitals  /71 (BP 1 Location: Left upper arm, BP Patient Position: Sitting, BP Cuff Size: Small adult)   Pulse (!) 58   Temp 97.3 °F (36.3 °C) (Oral)   Resp 12   Ht 5' 4\" (1.626 m)   Wt 175 lb (79.4 kg)   SpO2 96%   BMI 30.04 kg/m²        Physical Exam  Constitutional:       Appearance: Normal appearance. She is not ill-appearing. Pulmonary:      Effort: No respiratory distress. Neurological:      Mental Status: She is alert. Current Outpatient Medications   Medication Sig    benazepriL (LOTENSIN) 40 mg tablet Take 1 Tablet by mouth Every morning. Indications: high blood pressure    solifenacin (VESICARE) 5 mg tablet Take 1 Tablet by mouth daily. Indications: overactive bladder    busPIRone (BUSPAR) 5 mg tablet Take 1 Tablet by mouth two (2) times a day. Indications: anxiety    amLODIPine (NORVASC) 10 mg tablet Take 1 Tablet by mouth daily. Indications: high blood pressure    psyllium husk (METAMUCIL PO) Take 3 Gum by mouth two (2) times a day. loratadine (CLARITIN) 10 mg tablet Take 10 mg by mouth Every morning. acetaminophen (TYLENOL ARTHRITIS PO) Take 1 Tablet by mouth two (2) times a day. 650 bid    biotin 10,000 mcg cap Take 1 Tablet by mouth every evening.    multivitamin (ONE A DAY) tablet Take 1 Tablet by mouth every evening. ascorbic acid, vitamin C, (VITAMIN C) 500 mg tablet Take 500 mg by mouth two (2) times a day. furosemide (LASIX) 20 mg tablet Take 1 Tablet by mouth every Monday, Wednesday, Friday. No current facility-administered medications for this visit.

## 2022-10-04 NOTE — ASSESSMENT & PLAN NOTE
History of colon polyps, reports last colonoscopy 5 years ago and wondering if she needs another one. Discussed current guidelines. She's not sure if she would want to treat colon cancer if found. She'll discuss with her daughter.  Referral provided if she is interested in getting another colonoscopy done

## 2022-10-05 LAB
T4 FREE SERPL-MCNC: 1 NG/DL (ref 0.8–1.5)
TSH SERPL DL<=0.05 MIU/L-ACNC: 2.13 UIU/ML (ref 0.36–3.74)

## 2022-10-06 LAB — T3 SERPL-MCNC: 80 NG/DL (ref 71–180)

## 2022-11-02 ENCOUNTER — TELEPHONE (OUTPATIENT)
Dept: INTERNAL MEDICINE CLINIC | Age: 82
End: 2022-11-02

## 2022-11-02 DIAGNOSIS — G89.29 CHRONIC LEFT SHOULDER PAIN: Primary | ICD-10-CM

## 2022-11-02 DIAGNOSIS — M25.512 CHRONIC LEFT SHOULDER PAIN: Primary | ICD-10-CM

## 2022-11-02 NOTE — TELEPHONE ENCOUNTER
Nurse returned pateint call and provided patient with detailed information regarding provider message with recommendations. Patient verbalized understanding and was thankful for the call.

## 2022-11-02 NOTE — TELEPHONE ENCOUNTER
Thyroid studies are normal.  I'm not sure why the results didn't get to my inbox.       I placed a referral for her left shoulder for ortho:  [address] [facility]  [address]  [place] [facility] [address]          Phone:  Fax:      [phone]

## 2022-11-02 NOTE — TELEPHONE ENCOUNTER
Patient would like a call back with her recent lab results. Patient also states her physical therapist is suggesting she see an orthopedic doctor. Patient would like a call back to discuss with the nurse if this is a good idea and see if her PCP has any recommendations.

## 2022-11-03 PROBLEM — Z00.00 ROUTINE ADULT HEALTH MAINTENANCE: Status: RESOLVED | Noted: 2022-10-04 | Resolved: 2022-11-03

## 2022-11-16 ENCOUNTER — TRANSCRIBE ORDER (OUTPATIENT)
Dept: SCHEDULING | Age: 82
End: 2022-11-16

## 2022-11-16 DIAGNOSIS — M25.512 ACUTE PAIN OF LEFT SHOULDER: ICD-10-CM

## 2022-11-16 DIAGNOSIS — M75.52 BURSITIS OF LEFT SHOULDER: Primary | ICD-10-CM

## 2022-11-16 DIAGNOSIS — I10 PRIMARY HYPERTENSION: ICD-10-CM

## 2022-11-16 DIAGNOSIS — Z96.612 S/P REVERSE TOTAL SHOULDER ARTHROPLASTY, LEFT: ICD-10-CM

## 2022-11-16 RX ORDER — AMLODIPINE BESYLATE 10 MG/1
TABLET ORAL
Qty: 90 TABLET | Refills: 3 | Status: SHIPPED | OUTPATIENT
Start: 2022-11-16

## 2022-11-29 ENCOUNTER — HOSPITAL ENCOUNTER (OUTPATIENT)
Dept: CT IMAGING | Age: 82
Discharge: HOME | End: 2022-11-29
Attending: ORTHOPAEDIC SURGERY
Payer: MEDICARE

## 2022-11-29 DIAGNOSIS — M25.512 ACUTE PAIN OF LEFT SHOULDER: ICD-10-CM

## 2022-11-29 DIAGNOSIS — M75.52 BURSITIS OF LEFT SHOULDER: ICD-10-CM

## 2022-11-29 DIAGNOSIS — Z96.612 S/P REVERSE TOTAL SHOULDER ARTHROPLASTY, LEFT: ICD-10-CM

## 2022-11-29 PROCEDURE — 73200 CT UPPER EXTREMITY W/O DYE: CPT

## 2022-12-16 NOTE — PROGRESS NOTES
I called patient to discuss her audiology results. She has a mild to moderate sensorineural hearing loss, downsloping, symmetrical.  She has already ordered hearing aids and has an appointment next week for fitting. She can return to me as needed.

## 2023-02-03 DIAGNOSIS — N32.81 OVERACTIVE BLADDER: ICD-10-CM

## 2023-02-03 RX ORDER — SOLIFENACIN SUCCINATE 5 MG/1
TABLET, FILM COATED ORAL
Qty: 90 TABLET | Refills: 3 | Status: SHIPPED | OUTPATIENT
Start: 2023-02-03

## 2023-04-04 ENCOUNTER — OFFICE VISIT (OUTPATIENT)
Dept: INTERNAL MEDICINE CLINIC | Age: 83
End: 2023-04-04

## 2023-04-04 PROBLEM — J34.89 NASAL LESION: Status: ACTIVE | Noted: 2023-04-04

## 2023-04-04 PROBLEM — Z00.00 MEDICARE ANNUAL WELLNESS VISIT, SUBSEQUENT: Status: ACTIVE | Noted: 2022-10-04

## 2023-04-04 PROBLEM — G56.03 BILATERAL CARPAL TUNNEL SYNDROME: Status: ACTIVE | Noted: 2023-04-04

## 2023-04-04 PROBLEM — L30.9 ECZEMA, UNSPECIFIED TYPE: Status: ACTIVE | Noted: 2023-04-04

## 2023-04-04 RX ORDER — BENAZEPRIL HYDROCHLORIDE 40 MG/1
40 TABLET ORAL EVERY MORNING
Qty: 90 TABLET | Refills: 3 | Status: SHIPPED
Start: 2023-04-04

## 2023-04-04 RX ORDER — AMLODIPINE BESYLATE 10 MG/1
TABLET ORAL
Qty: 90 TABLET | Refills: 3 | Status: SHIPPED
Start: 2023-04-04

## 2023-04-04 RX ORDER — PREDNISONE 20 MG/1
20 TABLET ORAL
Qty: 14 TABLET | Refills: 0 | Status: SHIPPED
Start: 2023-04-04 | End: 2023-04-18

## 2023-04-04 RX ORDER — TRIAMCINOLONE ACETONIDE 1 MG/G
CREAM TOPICAL 2 TIMES DAILY
Qty: 28.4 G | Refills: 0 | Status: SHIPPED
Start: 2023-04-04

## 2023-04-04 RX ORDER — FUROSEMIDE 20 MG/1
20 TABLET ORAL
Qty: 36 TABLET | Refills: 3 | Status: SHIPPED
Start: 2023-04-05

## 2023-04-04 RX ORDER — DICLOFENAC SODIUM 10 MG/G
GEL TOPICAL
Qty: 450 G | Refills: 1 | Status: SHIPPED
Start: 2023-04-04

## 2023-04-04 RX ORDER — BUSPIRONE HYDROCHLORIDE 5 MG/1
5 TABLET ORAL 2 TIMES DAILY
Qty: 180 TABLET | Refills: 3 | Status: SHIPPED
Start: 2023-04-04

## 2023-04-04 NOTE — ASSESSMENT & PLAN NOTE
Rash  Started on left chest then arm now on leg   Itchy  Started 2 months ago   Appearance of scaly patch on leg consistent with likely eczema.   Recommend triamcinolone cream

## 2023-04-04 NOTE — ASSESSMENT & PLAN NOTE
Discussed shingles vaccine, covid booster PCV 20  Has had falls since last visit that she attributes to poor depth perception and getting her foot stuck on the stair, wobbling if turning too quickly   Does have an advanced directive  PT for fall risk rowan shafer

## 2023-04-04 NOTE — PATIENT INSTRUCTIONS
For your wrist pain - take prednisone 20mg for 2 weeks. If not better, call your orthopedic office for an appointment  For your rash - use triamcinolone cream as needed. Do not use longer than 7 days in a row (you can use for 7 days if needed then stop for 7 days then start again only if needed)  For your nose - schedule appointment with the ENT doctor      Medicare Wellness Visit, Female     The best way to live healthy is to have a lifestyle where you eat a well-balanced diet, exercise regularly, limit alcohol use, and quit all forms of tobacco/nicotine, if applicable. Regular preventive services are another way to keep healthy. Preventive services (vaccines, screening tests, monitoring & exams) can help personalize your care plan, which helps you manage your own care. Screening tests can find health problems at the earliest stages, when they are easiest to treat. [facility] follows the current, evidence-based guidelines published by the [facility] (USPSTF) when recommending preventive services for our patients. Because we follow these guidelines, sometimes recommendations change over time as research supports it. (For example, mammograms used to be recommended annually. Even though Medicare will still pay for an annual mammogram, the newer guidelines recommend a mammogram every two years for women of average risk). Of course, you and your doctor may decide to screen more often for some diseases, based on your risk and your co-morbidities (chronic disease you are already diagnosed with). Preventive services for you include:  - Medicare offers their members a free annual wellness visit, which is time for you and your primary care provider to discuss and plan for your preventive service needs.  Take advantage of this benefit every year!    -Over the age of 72 should receive the recommended pneumonia vaccines.    -All adults should have a flu vaccine yearly.  -All adults should have a tetanus vaccine every 10 years.   -Over the age 48 should receive the shingles vaccines.        -All adults should be screened once for Hepatitis C.  -All adults age 38-68 who are overweight should have a diabetes screening test once every three years.   -Other screening tests and preventive services for persons with diabetes include: an eye exam to screen for diabetic retinopathy, a kidney function test, a foot exam, and stricter control over your cholesterol.   -Cardiovascular screening for adults with routine risk involves an electrocardiogram (ECG) at intervals determined by your doctor.     -Colorectal cancer screenings should be done for adults age 39-70 with no increased risk factors for colorectal cancer. There are a number of acceptable methods of screening for this type of cancer. Each test has its own benefits and drawbacks. Discuss with your doctor what is most appropriate for you during your annual wellness visit. The different tests include: colonoscopy (considered the best screening method), a fecal occult blood test, a fecal DNA test, and sigmoidoscopy.    -Lung cancer screening is recommended annually with a low dose CT scan for adults between age 54 and 68, who have smoked at least 30 pack years (equivalent of 1 pack per day for 30 days), and who is a current smoker or quit less than 15 years ago.    -A bone mass density test is recommended when a woman turns 65 to screen for osteoporosis. This test is only recommended one time, as a screening. Some providers will use this same test as a disease monitoring tool if you already have osteoporosis. -Breast cancer screenings are recommended every other year for women of normal risk, age 54-69.    -Cervical cancer screenings for women over age 72 are only recommended with certain risk factors.      Here is a list of your current Health Maintenance items (your personalized list of preventive services) with a due date:  Health Maintenance Due   Topic Date Due    DTaP/Tdap/Td  (1 - Tdap) Never done    Shingles Vaccine (1 of 2) Never done    Pneumococcal Vaccine (1 - PCV) Never done    COVID-19 Vaccine (4 - Booster for Ozella Tupelo series) 12/22/2021

## 2023-04-04 NOTE — ASSESSMENT & PLAN NOTE
Lesion inside right nostril lateral wall that won't heal   Recommend ENT referral given persistent lesion that will not heal to rule out cancer

## 2023-04-04 NOTE — ASSESSMENT & PLAN NOTE
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 Recommend short course of steroids.   If still no improvement, advised to schedule appointment with Ortho

## 2023-04-04 NOTE — ASSESSMENT & PLAN NOTE
Ultrasound last year for right upper quadrant incidentally found renal cyst  Repeat ultrasound ordered

## 2023-05-04 PROBLEM — Z00.00 MEDICARE ANNUAL WELLNESS VISIT, SUBSEQUENT: Status: RESOLVED | Noted: 2022-10-04 | Resolved: 2023-05-04

## 2023-05-10 ENCOUNTER — TELEMEDICINE (OUTPATIENT)
Age: 83
End: 2023-05-10
Payer: COMMERCIAL

## 2023-05-10 ENCOUNTER — TELEPHONE (OUTPATIENT)
Age: 83
End: 2023-05-10

## 2023-05-10 DIAGNOSIS — U07.1 COVID-19: Primary | ICD-10-CM

## 2023-05-10 PROCEDURE — 99213 OFFICE O/P EST LOW 20 MIN: CPT | Performed by: INTERNAL MEDICINE

## 2023-05-10 PROCEDURE — 1123F ACP DISCUSS/DSCN MKR DOCD: CPT | Performed by: INTERNAL MEDICINE

## 2023-05-10 RX ORDER — BENZONATATE 100 MG/1
100 CAPSULE ORAL 3 TIMES DAILY PRN
Qty: 30 CAPSULE | Refills: 0 | Status: SHIPPED | OUTPATIENT
Start: 2023-05-10 | End: 2023-05-20

## 2023-05-10 ASSESSMENT — PATIENT HEALTH QUESTIONNAIRE - PHQ9
SUM OF ALL RESPONSES TO PHQ9 QUESTIONS 1 & 2: 0
SUM OF ALL RESPONSES TO PHQ QUESTIONS 1-9: 0
SUM OF ALL RESPONSES TO PHQ QUESTIONS 1-9: 0
1. LITTLE INTEREST OR PLEASURE IN DOING THINGS: 0
2. FEELING DOWN, DEPRESSED OR HOPELESS: 0
SUM OF ALL RESPONSES TO PHQ QUESTIONS 1-9: 0
SUM OF ALL RESPONSES TO PHQ QUESTIONS 1-9: 0

## 2023-05-10 NOTE — TELEPHONE ENCOUNTER
Spoke with patient and Joanne/Dtr (HIPPA VERIFIED) and they are requesting a Same Day virtual appt. Patient tested positive for COVID this am.  Current symptoms are persistent deep cough, headache, loss of smell, malaise. Inquiring about Paxlovid. Advised that Dr. Qyuen Leung will evaluate her current medical status , risk factors, medications and decide on whether to prescribe Paxlovid. Scheduled for today at 2:00 PM. They state understanding and grateful for the call.

## 2023-05-10 NOTE — PROGRESS NOTES
Consent: Judy Rosenthal, who was seen by synchronous (real-time) audio-video technology, and/or her healthcare decision maker, is aware that this patient-initiated, Telehealth encounter on 5/10/2023 is a billable service, with coverage as determined by her insurance carrier. She is aware that she may receive a bill and has provided verbal consent to proceed: Yes. I was in the office while conducting this encounter. Patient identification was verified at the start of the visit: YES  This visit was done with [employer]  The patient was located at home in a state where the provider was licensed to provide care. Patient was located at home      Note   Chief Complaint   clara Rosenthal is a 82 y.o. female     1. COVID-19  Assessment & Plan:  Sx started Monday night  Diagnosed with covid  Daughter also has it  Cough, sore throat, fatigue, headache, loss of smell, low grade fever  No SOB, chills  Paxlovid sent to pharmacy. Stop vesicare for 8 days. To minimize SE. Tessalon perles for cough. ER if severe SOB. Call if no improvement  Orders:  -     nirmatrelvir/ritonavir (PAXLOVID) 10 x 150 MG & 10 x 100MG TBPK; Take 2 tablets (one 150 mg nirmatrelvir and one 100 mg ritonavir tablets) by mouth every 12 hours for 5 days. , Disp-20 tablet, R-0Does need renal dosingNormal  -     benzonatate (TESSALON) 100 MG capsule; Take 1 capsule by mouth 3 times daily as needed for Cough, Disp-30 capsule, R-0Normal       We discussed the expected course, resolution and complications of the diagnosis(es) in detail. Medication risks, benefits, costs, interactions, and alternatives were discussed as indicated. I advised her to contact the office if her condition worsens, changes or fails to improve as anticipated. She expressed understanding with the diagnosis(es) and plan.      Return to clinic:  as needed  Moved from [facility] 12/2021  Daughter and son in law live here     Cortes Duarte MD  Internal Medicine

## 2023-05-10 NOTE — ASSESSMENT & PLAN NOTE
Sx started Monday night  Diagnosed with covid  Daughter also has it  Cough, sore throat, fatigue, headache, loss of smell, low grade fever  No SOB, chills  Paxlovid sent to pharmacy. Stop vesicare for 8 days. To minimize SE. Tessalon perles for cough. ER if severe SOB.  Call if no improvement

## 2023-05-10 NOTE — TELEPHONE ENCOUNTER
Pt has covid and wants to know what she can do for her cough. She also wants to know what to do if the pt has a fever? Should she go to the er or pt first because of her age.      Thank you

## 2023-08-28 SDOH — HEALTH STABILITY: PHYSICAL HEALTH: ON AVERAGE, HOW MANY DAYS PER WEEK DO YOU ENGAGE IN MODERATE TO STRENUOUS EXERCISE (LIKE A BRISK WALK)?: 0 DAYS

## 2023-08-28 ASSESSMENT — SOCIAL DETERMINANTS OF HEALTH (SDOH)

## 2023-08-29 ENCOUNTER — OFFICE VISIT (OUTPATIENT)
Age: 83
End: 2023-08-29
Payer: COMMERCIAL

## 2023-08-29 VITALS
TEMPERATURE: 97.8 F | HEART RATE: 89 BPM | BODY MASS INDEX: 29.02 KG/M2 | HEIGHT: 64 IN | SYSTOLIC BLOOD PRESSURE: 112 MMHG | RESPIRATION RATE: 18 BRPM | WEIGHT: 170 LBS | DIASTOLIC BLOOD PRESSURE: 72 MMHG | OXYGEN SATURATION: 97 %

## 2023-08-29 DIAGNOSIS — J34.89 NASAL DRAINAGE: ICD-10-CM

## 2023-08-29 DIAGNOSIS — R30.0 DYSURIA: Primary | ICD-10-CM

## 2023-08-29 DIAGNOSIS — Z76.89 ESTABLISHING CARE WITH NEW DOCTOR, ENCOUNTER FOR: ICD-10-CM

## 2023-08-29 DIAGNOSIS — Z23 NEED FOR VACCINATION: ICD-10-CM

## 2023-08-29 PROCEDURE — 90472 IMMUNIZATION ADMIN EACH ADD: CPT | Performed by: NURSE PRACTITIONER

## 2023-08-29 PROCEDURE — 99203 OFFICE O/P NEW LOW 30 MIN: CPT | Performed by: NURSE PRACTITIONER

## 2023-08-29 PROCEDURE — 90677 PCV20 VACCINE IM: CPT | Performed by: NURSE PRACTITIONER

## 2023-08-29 PROCEDURE — 90694 VACC AIIV4 NO PRSRV 0.5ML IM: CPT | Performed by: NURSE PRACTITIONER

## 2023-08-29 PROCEDURE — 90471 IMMUNIZATION ADMIN: CPT | Performed by: NURSE PRACTITIONER

## 2023-08-29 PROCEDURE — 81003 URINALYSIS AUTO W/O SCOPE: CPT | Performed by: NURSE PRACTITIONER

## 2023-08-29 PROCEDURE — 3074F SYST BP LT 130 MM HG: CPT | Performed by: NURSE PRACTITIONER

## 2023-08-29 PROCEDURE — 1123F ACP DISCUSS/DSCN MKR DOCD: CPT | Performed by: NURSE PRACTITIONER

## 2023-08-29 PROCEDURE — 3078F DIAST BP <80 MM HG: CPT | Performed by: NURSE PRACTITIONER

## 2023-08-29 RX ORDER — FLUTICASONE PROPIONATE 50 MCG
2 SPRAY, SUSPENSION (ML) NASAL DAILY
Qty: 48 G | Refills: 2 | Status: SHIPPED | OUTPATIENT
Start: 2023-08-29

## 2023-08-29 ASSESSMENT — PATIENT HEALTH QUESTIONNAIRE - PHQ9
2. FEELING DOWN, DEPRESSED OR HOPELESS: 1
SUM OF ALL RESPONSES TO PHQ QUESTIONS 1-9: 2
SUM OF ALL RESPONSES TO PHQ QUESTIONS 1-9: 2
SUM OF ALL RESPONSES TO PHQ9 QUESTIONS 1 & 2: 2
SUM OF ALL RESPONSES TO PHQ QUESTIONS 1-9: 2
SUM OF ALL RESPONSES TO PHQ QUESTIONS 1-9: 2
1. LITTLE INTEREST OR PLEASURE IN DOING THINGS: 1

## 2023-08-29 ASSESSMENT — ENCOUNTER SYMPTOMS: COUGH: 1

## 2023-08-30 ENCOUNTER — TELEPHONE (OUTPATIENT)
Age: 83
End: 2023-08-30

## 2023-08-30 LAB
BILIRUBIN, URINE, POC: NEGATIVE
BLOOD URINE, POC: NEGATIVE
GLUCOSE URINE, POC: NEGATIVE
KETONES, URINE, POC: NEGATIVE
LEUKOCYTE ESTERASE, URINE, POC: ABNORMAL
NITRITE, URINE, POC: NEGATIVE
PH, URINE, POC: 5.5 (ref 4.6–8)
PROTEIN,URINE, POC: NEGATIVE
SPECIFIC GRAVITY, URINE, POC: 1.01 (ref 1–1.03)
URINALYSIS CLARITY, POC: CLEAR
URINALYSIS COLOR, POC: YELLOW
UROBILINOGEN, POC: ABNORMAL

## 2023-08-31 DIAGNOSIS — R30.0 DYSURIA: Primary | ICD-10-CM

## 2023-09-01 ENCOUNTER — NURSE ONLY (OUTPATIENT)
Age: 83
End: 2023-09-01

## 2023-09-01 DIAGNOSIS — R30.0 DYSURIA: ICD-10-CM

## 2023-09-02 LAB
BACTERIA SPEC CULT: NORMAL
SERVICE CMNT-IMP: NORMAL

## 2023-09-05 ENCOUNTER — TELEPHONE (OUTPATIENT)
Age: 83
End: 2023-09-05

## 2023-09-05 RX ORDER — FLUCONAZOLE 150 MG/1
150 TABLET ORAL ONCE
Qty: 1 TABLET | Refills: 0 | Status: SHIPPED | OUTPATIENT
Start: 2023-09-05 | End: 2023-09-05

## 2023-09-05 NOTE — TELEPHONE ENCOUNTER
----- Message from ANTHONY Zayas NP sent at 9/5/2023  7:53 AM EDT -----  Please call patient and let her know that her urine culture returned negative, no UTI. Does she still have burning with urination?   If so, can try something for yeast  Let me know

## 2023-09-05 NOTE — TELEPHONE ENCOUNTER
Labs reviewed with patient and they verbalized understanding   Pt is still having burning and she would like something for yeast.

## 2023-09-07 ENCOUNTER — NURSE ONLY (OUTPATIENT)
Age: 83
End: 2023-09-07

## 2023-09-07 DIAGNOSIS — I10 PRIMARY HYPERTENSION: ICD-10-CM

## 2023-09-07 DIAGNOSIS — I10 PRIMARY HYPERTENSION: Primary | ICD-10-CM

## 2023-09-08 LAB
ALBUMIN SERPL-MCNC: 3.6 G/DL (ref 3.5–5)
ALBUMIN/GLOB SERPL: 1.2 (ref 1.1–2.2)
ALP SERPL-CCNC: 64 U/L (ref 45–117)
ALT SERPL-CCNC: 40 U/L (ref 12–78)
ANION GAP SERPL CALC-SCNC: 1 MMOL/L (ref 5–15)
AST SERPL-CCNC: 26 U/L (ref 15–37)
BILIRUB SERPL-MCNC: 0.3 MG/DL (ref 0.2–1)
BUN SERPL-MCNC: 30 MG/DL (ref 6–20)
BUN/CREAT SERPL: 22 (ref 12–20)
CALCIUM SERPL-MCNC: 9.8 MG/DL (ref 8.5–10.1)
CHLORIDE SERPL-SCNC: 108 MMOL/L (ref 97–108)
CHOLEST SERPL-MCNC: 175 MG/DL
CO2 SERPL-SCNC: 32 MMOL/L (ref 21–32)
CREAT SERPL-MCNC: 1.35 MG/DL (ref 0.55–1.02)
ERYTHROCYTE [DISTWIDTH] IN BLOOD BY AUTOMATED COUNT: 14.6 % (ref 11.5–14.5)
GLOBULIN SER CALC-MCNC: 3.1 G/DL (ref 2–4)
GLUCOSE SERPL-MCNC: 80 MG/DL (ref 65–100)
HCT VFR BLD AUTO: 39.2 % (ref 35–47)
HDLC SERPL-MCNC: 93 MG/DL
HDLC SERPL: 1.9 (ref 0–5)
HGB BLD-MCNC: 12.1 G/DL (ref 11.5–16)
LDLC SERPL CALC-MCNC: 72.4 MG/DL (ref 0–100)
MCH RBC QN AUTO: 28.4 PG (ref 26–34)
MCHC RBC AUTO-ENTMCNC: 30.9 G/DL (ref 30–36.5)
MCV RBC AUTO: 92 FL (ref 80–99)
NRBC # BLD: 0 K/UL (ref 0–0.01)
NRBC BLD-RTO: 0 PER 100 WBC
PLATELET # BLD AUTO: 183 K/UL (ref 150–400)
PMV BLD AUTO: 11.5 FL (ref 8.9–12.9)
POTASSIUM SERPL-SCNC: 4.4 MMOL/L (ref 3.5–5.1)
PROT SERPL-MCNC: 6.7 G/DL (ref 6.4–8.2)
RBC # BLD AUTO: 4.26 M/UL (ref 3.8–5.2)
SODIUM SERPL-SCNC: 141 MMOL/L (ref 136–145)
TRIGL SERPL-MCNC: 48 MG/DL
VLDLC SERPL CALC-MCNC: 9.6 MG/DL
WBC # BLD AUTO: 5.2 K/UL (ref 3.6–11)

## 2023-09-11 ENCOUNTER — TELEPHONE (OUTPATIENT)
Age: 83
End: 2023-09-11

## 2023-09-11 DIAGNOSIS — R79.89 ELEVATED SERUM CREATININE: Primary | ICD-10-CM

## 2023-09-11 NOTE — TELEPHONE ENCOUNTER
Called pt and relayed results.  Pt has not seen a nephrologist and would like referral sent via [facility]

## 2023-10-16 ENCOUNTER — HOSPITAL ENCOUNTER (OUTPATIENT)
Facility: HOSPITAL | Age: 83
Discharge: HOME | End: 2023-10-19
Attending: INTERNAL MEDICINE
Payer: MEDICARE

## 2023-10-16 DIAGNOSIS — N18.31 STAGE 3A CHRONIC KIDNEY DISEASE (HCC): ICD-10-CM

## 2023-10-16 PROCEDURE — 76770 US EXAM ABDO BACK WALL COMP: CPT

## 2024-01-30 SDOH — HEALTH STABILITY: PHYSICAL HEALTH: ON AVERAGE, HOW MANY DAYS PER WEEK DO YOU ENGAGE IN MODERATE TO STRENUOUS EXERCISE (LIKE A BRISK WALK)?: 0 DAYS

## 2024-01-30 SDOH — HEALTH STABILITY: PHYSICAL HEALTH: ON AVERAGE, HOW MANY MINUTES DO YOU ENGAGE IN EXERCISE AT THIS LEVEL?: 0 MIN

## 2024-02-01 ENCOUNTER — OFFICE VISIT (OUTPATIENT)
Age: 84
End: 2024-02-01
Payer: MEDICARE

## 2024-02-01 VITALS
SYSTOLIC BLOOD PRESSURE: 168 MMHG | WEIGHT: 173.2 LBS | RESPIRATION RATE: 18 BRPM | HEIGHT: 64 IN | TEMPERATURE: 97.7 F | BODY MASS INDEX: 29.57 KG/M2 | DIASTOLIC BLOOD PRESSURE: 82 MMHG

## 2024-02-01 DIAGNOSIS — N39.46 MIXED STRESS AND URGE URINARY INCONTINENCE: ICD-10-CM

## 2024-02-01 DIAGNOSIS — M19.041 DEGENERATIVE ARTHRITIS OF METACARPOPHALANGEAL JOINT OF RIGHT THUMB: ICD-10-CM

## 2024-02-01 DIAGNOSIS — N18.31 STAGE 3A CHRONIC KIDNEY DISEASE (HCC): Primary | ICD-10-CM

## 2024-02-01 DIAGNOSIS — G56.01 CARPAL TUNNEL SYNDROME OF RIGHT WRIST: ICD-10-CM

## 2024-02-01 DIAGNOSIS — I10 PRIMARY HYPERTENSION: ICD-10-CM

## 2024-02-01 PROCEDURE — G8484 FLU IMMUNIZE NO ADMIN: HCPCS | Performed by: FAMILY MEDICINE

## 2024-02-01 PROCEDURE — G8419 CALC BMI OUT NRM PARAM NOF/U: HCPCS | Performed by: FAMILY MEDICINE

## 2024-02-01 PROCEDURE — 1036F TOBACCO NON-USER: CPT | Performed by: FAMILY MEDICINE

## 2024-02-01 PROCEDURE — 99214 OFFICE O/P EST MOD 30 MIN: CPT | Performed by: FAMILY MEDICINE

## 2024-02-01 PROCEDURE — 0509F URINE INCON PLAN DOCD: CPT | Performed by: FAMILY MEDICINE

## 2024-02-01 PROCEDURE — 3079F DIAST BP 80-89 MM HG: CPT | Performed by: FAMILY MEDICINE

## 2024-02-01 PROCEDURE — G8400 PT W/DXA NO RESULTS DOC: HCPCS | Performed by: FAMILY MEDICINE

## 2024-02-01 PROCEDURE — G8427 DOCREV CUR MEDS BY ELIG CLIN: HCPCS | Performed by: FAMILY MEDICINE

## 2024-02-01 PROCEDURE — 3077F SYST BP >= 140 MM HG: CPT | Performed by: FAMILY MEDICINE

## 2024-02-01 PROCEDURE — 1123F ACP DISCUSS/DSCN MKR DOCD: CPT | Performed by: FAMILY MEDICINE

## 2024-02-01 PROCEDURE — 1090F PRES/ABSN URINE INCON ASSESS: CPT | Performed by: FAMILY MEDICINE

## 2024-02-01 RX ORDER — ASPIRIN 81 MG
TABLET, DELAYED RELEASE (ENTERIC COATED) ORAL
COMMUNITY

## 2024-02-01 RX ORDER — MULTIVITAMIN
1 TABLET ORAL EVERY EVENING
COMMUNITY

## 2024-02-01 ASSESSMENT — PATIENT HEALTH QUESTIONNAIRE - PHQ9
SUM OF ALL RESPONSES TO PHQ QUESTIONS 1-9: 1
SUM OF ALL RESPONSES TO PHQ9 QUESTIONS 1 & 2: 1
2. FEELING DOWN, DEPRESSED OR HOPELESS: 0
SUM OF ALL RESPONSES TO PHQ QUESTIONS 1-9: 1

## 2024-02-01 NOTE — PROGRESS NOTES
Identified pt with two pt identifiers(name and ).    Chief Complaint   Patient presents with    Establish Care     Patient is here to establish care, prior pcp was piyush graham     Carpal Tunnel     Patient is having a lot of problems with carpal tunnel and would like to discuss     Incontinence     Patient is having a hard time with urinary incontinence and would like to discuss as well as a vaginal discharge for the past year     Leg Pain     Patient is having a lot of cramps in both legs and feet and would like to discuss         Health Maintenance Due   Topic    Shingles vaccine (1 of 2)    Respiratory Syncytial Virus (RSV) Pregnant or age 60 yrs+ (1 - 1-dose 60+ series)    COVID-19 Vaccine ( season)    Annual Wellness Visit (Medicare Advantage)        Wt Readings from Last 3 Encounters:   23 77.1 kg (170 lb)   23 77.7 kg (171 lb 3.2 oz)   10/04/22 79.4 kg (175 lb)     Temp Readings from Last 3 Encounters:   23 97.8 °F (36.6 °C) (Temporal)     BP Readings from Last 3 Encounters:   23 112/72   23 (!) 144/71   10/04/22 127/71     Pulse Readings from Last 3 Encounters:   23 89   23 54   10/04/22 58           Depression Screening:  :         2024     3:04 PM 2023    10:34 AM 5/10/2023     1:51 PM 2023     1:36 PM 10/4/2022    10:43 AM 2022    11:17 AM 2022     1:10 PM   PHQ-9 Questionaire   Little interest or pleasure in doing things 1 1 0 0 1 0 0   Feeling down, depressed, or hopeless 0 1 0 0 1 0 0   PHQ-9 Total Score 1 2 0 0 2 0 0        Fall Risk Assessment:  :         5/10/2023     1:52 PM   Fall Risk   2 or more falls in past year? yes   Fall with injury in past year? no        Abuse Screening:  :          No data to display                 Coordination of Care Questionnaire:  :     \"Have you been to the ER, urgent care clinic since your last visit?  Hospitalized since your last visit?\"    NO    “Have you seen or consulted any

## 2024-03-30 SDOH — HEALTH STABILITY: PHYSICAL HEALTH: ON AVERAGE, HOW MANY MINUTES DO YOU ENGAGE IN EXERCISE AT THIS LEVEL?: 0 MIN

## 2024-03-30 SDOH — HEALTH STABILITY: PHYSICAL HEALTH: ON AVERAGE, HOW MANY DAYS PER WEEK DO YOU ENGAGE IN MODERATE TO STRENUOUS EXERCISE (LIKE A BRISK WALK)?: 0 DAYS

## 2024-03-30 ASSESSMENT — PATIENT HEALTH QUESTIONNAIRE - PHQ9
SUM OF ALL RESPONSES TO PHQ QUESTIONS 1-9: 0
2. FEELING DOWN, DEPRESSED OR HOPELESS: NOT AT ALL
SUM OF ALL RESPONSES TO PHQ9 QUESTIONS 1 & 2: 0
SUM OF ALL RESPONSES TO PHQ QUESTIONS 1-9: 0
1. LITTLE INTEREST OR PLEASURE IN DOING THINGS: NOT AT ALL

## 2024-03-30 ASSESSMENT — LIFESTYLE VARIABLES
HOW OFTEN DO YOU HAVE A DRINK CONTAINING ALCOHOL: NEVER
HOW OFTEN DO YOU HAVE A DRINK CONTAINING ALCOHOL: 1
HOW OFTEN DO YOU HAVE SIX OR MORE DRINKS ON ONE OCCASION: 1
HOW MANY STANDARD DRINKS CONTAINING ALCOHOL DO YOU HAVE ON A TYPICAL DAY: PATIENT DOES NOT DRINK
HOW MANY STANDARD DRINKS CONTAINING ALCOHOL DO YOU HAVE ON A TYPICAL DAY: 0

## 2024-04-02 ENCOUNTER — OFFICE VISIT (OUTPATIENT)
Age: 84
End: 2024-04-02
Payer: MEDICARE

## 2024-04-02 VITALS
BODY MASS INDEX: 29.67 KG/M2 | RESPIRATION RATE: 18 BRPM | OXYGEN SATURATION: 99 % | WEIGHT: 173.8 LBS | SYSTOLIC BLOOD PRESSURE: 152 MMHG | DIASTOLIC BLOOD PRESSURE: 60 MMHG | HEIGHT: 64 IN | TEMPERATURE: 97 F | HEART RATE: 52 BPM

## 2024-04-02 DIAGNOSIS — N81.10 VAGINAL PROLAPSE WITHOUT UTERINE PROLAPSE: ICD-10-CM

## 2024-04-02 DIAGNOSIS — Z00.00 MEDICARE ANNUAL WELLNESS VISIT, SUBSEQUENT: Primary | ICD-10-CM

## 2024-04-02 DIAGNOSIS — I10 PRIMARY HYPERTENSION: ICD-10-CM

## 2024-04-02 PROCEDURE — G0439 PPPS, SUBSEQ VISIT: HCPCS | Performed by: FAMILY MEDICINE

## 2024-04-02 PROCEDURE — 3078F DIAST BP <80 MM HG: CPT | Performed by: FAMILY MEDICINE

## 2024-04-02 PROCEDURE — 1123F ACP DISCUSS/DSCN MKR DOCD: CPT | Performed by: FAMILY MEDICINE

## 2024-04-02 PROCEDURE — 3077F SYST BP >= 140 MM HG: CPT | Performed by: FAMILY MEDICINE

## 2024-04-02 RX ORDER — BENAZEPRIL HYDROCHLORIDE 40 MG/1
40 TABLET ORAL EVERY MORNING
Qty: 90 TABLET | Refills: 1 | Status: SHIPPED | OUTPATIENT
Start: 2024-04-02

## 2024-04-02 RX ORDER — AMLODIPINE BESYLATE 10 MG/1
10 TABLET ORAL DAILY
Qty: 90 TABLET | Refills: 1 | Status: SHIPPED | OUTPATIENT
Start: 2024-04-02

## 2024-04-02 RX ORDER — FUROSEMIDE 20 MG/1
20 TABLET ORAL AS NEEDED
Qty: 120 TABLET | Refills: 0 | Status: SHIPPED | OUTPATIENT
Start: 2024-04-02

## 2024-04-02 NOTE — PROGRESS NOTES
Identified pt with two pt identifiers(name and ).    Chief Complaint   Patient presents with    Medicare AWV    Vaginal Discharge     Patient stated at her last visit she had complaints of vaginal discharge and constipation and would like to discuss     Medication Check     Patient would like to go over medications, mainly the furosemide on how she should be taking it         Health Maintenance Due   Topic    Shingles vaccine (1 of 2)    Respiratory Syncytial Virus (RSV) Pregnant or age 60 yrs+ (1 - 1-dose 60+ series)    COVID-19 Vaccine ( season)    Annual Wellness Visit (Medicare Advantage)        Wt Readings from Last 3 Encounters:   24 78.5 kg (173 lb)   24 78.6 kg (173 lb 3.2 oz)   23 77.1 kg (170 lb)     Temp Readings from Last 3 Encounters:   24 97.7 °F (36.5 °C) (Temporal)   23 97.8 °F (36.6 °C) (Temporal)     BP Readings from Last 3 Encounters:   24 (!) 168/82   23 112/72   23 (!) 144/71     Pulse Readings from Last 3 Encounters:   23 89   23 54   10/04/22 58           Depression Screening:  :         3/30/2024     1:31 PM 2024     3:04 PM 2023    10:34 AM 5/10/2023     1:51 PM 2023     1:36 PM 10/4/2022    10:43 AM 2022    11:17 AM   PHQ-9 Questionaire   Little interest or pleasure in doing things 0 1 1 0 0 1 0   Feeling down, depressed, or hopeless 0 0 1 0 0 1 0   PHQ-9 Total Score 0 1 2 0 0 2 0        Fall Risk Assessment:  :         3/30/2024     1:31 PM 5/10/2023     1:52 PM   Fall Risk   2 or more falls in past year? no yes   Fall with injury in past year? no no        Abuse Screening:  :          No data to display                 Coordination of Care Questionnaire:  :     \"Have you been to the ER, urgent care clinic since your last visit?  Hospitalized since your last visit?\"    NO    “Have you seen or consulted any other health care providers outside of [facility] since your last visit?”

## 2024-04-03 NOTE — PROGRESS NOTES
Medicare Annual Wellness Visit    Dano Ritter is here for Medicare AWV, Vaginal Discharge (Patient stated at her last visit she had complaints of vaginal discharge and constipation and would like to discuss ), and Medication Check (Patient would like to go over medications, mainly the furosemide on how she should be taking it )    Assessment & Plan   Medicare annual wellness visit, subsequent  Primary hypertension  -     benazepril (LOTENSIN) 40 MG tablet; Take 1 tablet by mouth every morning, Disp-90 tablet, R-1Normal  -     amLODIPine (NORVASC) 10 MG tablet; Take 1 tablet by mouth daily, Disp-90 tablet, R-1Normal  -     furosemide (LASIX) 20 MG tablet; Take 1 tablet by mouth as needed (MWF), Disp-120 tablet, R-0Normal  Vaginal prolapse without uterine prolapse  -     Nuswab Vaginitis Plus (VG+); Future    Recommendations for Preventive Services Due: see orders and patient instructions/AVS.  Recommended screening schedule for the next 5-10 years is provided to the patient in written form: see Patient Instructions/AVS.     No follow-ups on file.     Subjective   The following acute and/or chronic problems were also addressed today:  Patient has complaints of a \"discharge\" coming out of the vaginal area, she has a hx of vaginal prolapse, constipation, and urinary tract infections.     Patient's complete Health Risk Assessment and screening values have been reviewed and are found in Flowsheets. The following problems were reviewed today and where indicated follow up appointments were made and/or referrals ordered.    Positive Risk Factor Screenings with Interventions:    Fall Risk:  Do you feel unsteady or are you worried about falling? : (!) yes  2 or more falls in past year?: no  Fall with injury in past year?: no     Interventions:    Reviewed medications, home hazards, visual acuity, and co-morbidities that can increase risk for falls    Cognitive:   Clock Drawing Test (CDT): (!) Abnormal  Words recalled: 3 Words

## 2024-04-05 LAB
A VAGINAE DNA VAG QL NAA+PROBE: NORMAL SCORE
BVAB2 DNA VAG QL NAA+PROBE: NORMAL SCORE
C ALBICANS DNA VAG QL NAA+PROBE: NEGATIVE
C GLABRATA DNA VAG QL NAA+PROBE: NEGATIVE
C TRACH RRNA SPEC QL NAA+PROBE: NEGATIVE
MEGA1 DNA VAG QL NAA+PROBE: NORMAL SCORE
N GONORRHOEA RRNA SPEC QL NAA+PROBE: NEGATIVE
SPECIMEN SOURCE: NORMAL
T VAGINALIS RRNA SPEC QL NAA+PROBE: NEGATIVE

## 2024-06-14 ENCOUNTER — OFFICE VISIT (OUTPATIENT)
Age: 84
End: 2024-06-14

## 2024-06-14 VITALS
DIASTOLIC BLOOD PRESSURE: 75 MMHG | TEMPERATURE: 98.6 F | OXYGEN SATURATION: 97 % | SYSTOLIC BLOOD PRESSURE: 139 MMHG | BODY MASS INDEX: 29.02 KG/M2 | HEART RATE: 88 BPM | WEIGHT: 170 LBS | HEIGHT: 64 IN

## 2024-06-14 DIAGNOSIS — S80.861A INSECT BITE OF RIGHT LOWER LEG, INITIAL ENCOUNTER: Primary | ICD-10-CM
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
## 2024-06-14 RX ORDER — METHYLPREDNISOLONE 4 MG/1
TABLET ORAL
Qty: 1 KIT | Refills: 0 | Status: SHIPPED | OUTPATIENT
Start: 2024-06-14 | End: 2024-06-20

## 2024-06-14 RX ORDER — TRIAMCINOLONE ACETONIDE 1 MG/G
CREAM TOPICAL
Qty: 15 G | Refills: 0 | Status: SHIPPED | OUTPATIENT
Start: 2024-06-14

## 2024-06-14 NOTE — PROGRESS NOTES
Dano Ritter (:  1940) is a 84 y.o. female,New patient, here for evaluation of the following chief complaint(s):  Insect Bite (Insect bite on right medial shin area approx 1 week ago - now red and and inflammed)      ASSESSMENT/PLAN:  Visit Diagnoses and Associated Orders       Insect bite of right lower leg, initial encounter    -  Primary    triamcinolone (KENALOG) 0.1 % cream [8113]      methylPREDNISolone (MEDROL DOSEPACK) 4 MG tablet [6241]                  Circled area of erythema.  Instructed patient to follow up if area becomes ulcerated or pus draining.    SUBJECTIVE/OBJECTIVE:      84 y.o. female presents with symptoms of   allergic reaction  to an insect bite. It is erythematous and warm to the touch. She is not sure what bit her while she was in [place] last week.         Vitals:    24 1709   BP: 139/75   Site: Left Upper Arm   Position: Sitting   Cuff Size: Medium Adult   Pulse: 88   Temp: 98.6 °F (37 °C)   TempSrc: Oral   SpO2: 97%   Weight: 77.1 kg (170 lb)   Height: 1.626 m (5' 4\")       No results found for this visit on 24.     Physical Exam  Vitals and nursing note reviewed.   Constitutional:       General: She is not in acute distress.     Appearance: Normal appearance.   HENT:      Head: Normocephalic and atraumatic.      Right Ear: Tympanic membrane, ear canal and external ear normal.      Left Ear: Tympanic membrane, ear canal and external ear normal.      Nose: Nose normal. No congestion.      Mouth/Throat:      Mouth: Mucous membranes are moist.      Pharynx: Oropharynx is clear. No oropharyngeal exudate or posterior oropharyngeal erythema.   Eyes:      Extraocular Movements: Extraocular movements intact.      Conjunctiva/sclera: Conjunctivae normal.      Pupils: Pupils are equal, round, and reactive to light.   Cardiovascular:      Rate and Rhythm: Normal rate and regular rhythm.   Pulmonary:      Effort: Pulmonary effort is normal. No respiratory distress.

## 2024-07-25 RX ORDER — SOLIFENACIN SUCCINATE 5 MG/1
5 TABLET, FILM COATED ORAL DAILY
Qty: 30 TABLET | Refills: 0 | Status: SHIPPED | OUTPATIENT
Start: 2024-07-25

## 2024-07-25 RX ORDER — BUSPIRONE HYDROCHLORIDE 5 MG/1
5 TABLET ORAL DAILY
Qty: 30 TABLET | Refills: 0 | Status: SHIPPED | OUTPATIENT
Start: 2024-07-25

## 2024-08-08 RX ORDER — SOLIFENACIN SUCCINATE 5 MG/1
5 TABLET, FILM COATED ORAL DAILY
Qty: 90 TABLET | Refills: 0 | Status: SHIPPED | OUTPATIENT
Start: 2024-08-08

## 2024-08-08 RX ORDER — BUSPIRONE HYDROCHLORIDE 5 MG/1
5 TABLET ORAL 2 TIMES DAILY
Qty: 180 TABLET | Refills: 0 | Status: SHIPPED | OUTPATIENT
Start: 2024-08-08

## 2024-08-08 NOTE — TELEPHONE ENCOUNTER
----- Message from Dano Ritter sent at 8/7/2024  5:30 PM EDT -----  Regarding: Need a short refill of 2 scripts  Contact: [phone]  Dr. Waller ,  We have not received the 2 prescriptions (above message)  from [facility].  They said you did not respond to their request - that you called them into [facility] instead.  I tried to tell them that was just for the interim to hold me over until the new ones came from them.    They said they would resend the request to you. BUT- also, the Buspirone that was prescribed by Dr. Bucio as 5MG TWICE a day.    I am worried they will only do once a day since that is what you sent to [facility].  Could you please make it for twice a day like it has been?  The other script was for Solifenacin 5 MG one tablet a day.

## 2024-08-12 RX ORDER — BUSPIRONE HYDROCHLORIDE 5 MG/1
10 TABLET ORAL DAILY
Qty: 120 TABLET | Refills: 1 | Status: SHIPPED | OUTPATIENT
Start: 2024-08-12

## 2024-08-19 RX ORDER — BUSPIRONE HYDROCHLORIDE 5 MG/1
5 TABLET ORAL DAILY
Qty: 90 TABLET | Refills: 1 | Status: SHIPPED | OUTPATIENT
Start: 2024-08-19

## 2024-10-21 RX ORDER — BUSPIRONE HYDROCHLORIDE 5 MG/1
5 TABLET ORAL 2 TIMES DAILY
Qty: 180 TABLET | Refills: 3 | Status: SHIPPED | OUTPATIENT
Start: 2024-10-21

## 2024-10-21 RX ORDER — SOLIFENACIN SUCCINATE 5 MG/1
5 TABLET, FILM COATED ORAL DAILY
Qty: 90 TABLET | Refills: 3 | Status: SHIPPED | OUTPATIENT
Start: 2024-10-21

## 2024-11-25 DIAGNOSIS — I10 PRIMARY HYPERTENSION: ICD-10-CM

## 2024-11-25 RX ORDER — FUROSEMIDE 20 MG/1
TABLET ORAL
Qty: 39 TABLET | Refills: 3 | Status: SHIPPED | OUTPATIENT
Start: 2024-11-25

## 2025-01-20 RX ORDER — BUSPIRONE HYDROCHLORIDE 5 MG/1
5 TABLET ORAL 2 TIMES DAILY
Qty: 180 TABLET | Refills: 3 | Status: SHIPPED | OUTPATIENT
Start: 2025-01-20

## 2025-02-03 DIAGNOSIS — I10 PRIMARY HYPERTENSION: ICD-10-CM

## 2025-02-03 RX ORDER — AMLODIPINE BESYLATE 10 MG/1
10 TABLET ORAL DAILY
Qty: 90 TABLET | Refills: 3 | Status: SHIPPED | OUTPATIENT
Start: 2025-02-03

## 2025-02-11 DIAGNOSIS — I10 PRIMARY HYPERTENSION: ICD-10-CM

## 2025-02-11 RX ORDER — BENAZEPRIL HYDROCHLORIDE 40 MG/1
40 TABLET ORAL EVERY MORNING
Qty: 90 TABLET | Refills: 3 | Status: SHIPPED | OUTPATIENT
Start: 2025-02-11 | End: 2025-02-13 | Stop reason: SDUPTHER

## 2025-02-12 DIAGNOSIS — I10 PRIMARY HYPERTENSION: ICD-10-CM

## 2025-02-13 RX ORDER — BENAZEPRIL HYDROCHLORIDE 40 MG/1
40 TABLET ORAL EVERY MORNING
Qty: 90 TABLET | Refills: 3 | OUTPATIENT
Start: 2025-02-13

## 2025-05-21 DIAGNOSIS — I10 PRIMARY HYPERTENSION: ICD-10-CM

## 2025-05-21 RX ORDER — BENAZEPRIL HYDROCHLORIDE 40 MG/1
40 TABLET ORAL EVERY MORNING
Qty: 14 TABLET | Refills: 0 | Status: SHIPPED | OUTPATIENT
Start: 2025-05-21

## 2025-05-21 NOTE — TELEPHONE ENCOUNTER
[facility] is requesting a new Rx for     benazepril (LOTENSIN) 40 MG tablet [[id number]]      [facility] - [place], [place] - [address] - P [phone] - F [phone]  [address], [facility] [address]  Phone: [phone]  Fax: [phone]

## 2025-06-01 SDOH — ECONOMIC STABILITY: INCOME INSECURITY: IN THE LAST 12 MONTHS, WAS THERE A TIME WHEN YOU WERE NOT ABLE TO PAY THE MORTGAGE OR RENT ON TIME?: NO

## 2025-06-01 SDOH — ECONOMIC STABILITY: FOOD INSECURITY: WITHIN THE PAST 12 MONTHS, YOU WORRIED THAT YOUR FOOD WOULD RUN OUT BEFORE YOU GOT MONEY TO BUY MORE.: NEVER TRUE

## 2025-06-01 SDOH — HEALTH STABILITY: PHYSICAL HEALTH: ON AVERAGE, HOW MANY DAYS PER WEEK DO YOU ENGAGE IN MODERATE TO STRENUOUS EXERCISE (LIKE A BRISK WALK)?: 2 DAYS

## 2025-06-01 SDOH — ECONOMIC STABILITY: FOOD INSECURITY: WITHIN THE PAST 12 MONTHS, THE FOOD YOU BOUGHT JUST DIDN'T LAST AND YOU DIDN'T HAVE MONEY TO GET MORE.: NEVER TRUE

## 2025-06-01 SDOH — HEALTH STABILITY: PHYSICAL HEALTH: ON AVERAGE, HOW MANY MINUTES DO YOU ENGAGE IN EXERCISE AT THIS LEVEL?: 60 MIN

## 2025-06-01 SDOH — ECONOMIC STABILITY: TRANSPORTATION INSECURITY
IN THE PAST 12 MONTHS, HAS LACK OF TRANSPORTATION KEPT YOU FROM MEETINGS, WORK, OR FROM GETTING THINGS NEEDED FOR DAILY LIVING?: NO

## 2025-06-01 SDOH — ECONOMIC STABILITY: TRANSPORTATION INSECURITY
IN THE PAST 12 MONTHS, HAS THE LACK OF TRANSPORTATION KEPT YOU FROM MEDICAL APPOINTMENTS OR FROM GETTING MEDICATIONS?: NO

## 2025-06-01 ASSESSMENT — PATIENT HEALTH QUESTIONNAIRE - PHQ9
1. LITTLE INTEREST OR PLEASURE IN DOING THINGS: SEVERAL DAYS
5. POOR APPETITE OR OVEREATING: NEARLY EVERY DAY
3. TROUBLE FALLING OR STAYING ASLEEP: NOT AT ALL
SUM OF ALL RESPONSES TO PHQ QUESTIONS 1-9: 2
7. TROUBLE CONCENTRATING ON THINGS, SUCH AS READING THE NEWSPAPER OR WATCHING TELEVISION: NOT AT ALL
1. LITTLE INTEREST OR PLEASURE IN DOING THINGS: SEVERAL DAYS
3. TROUBLE FALLING OR STAYING ASLEEP: NOT AT ALL
7. TROUBLE CONCENTRATING ON THINGS, SUCH AS READING THE NEWSPAPER OR WATCHING TELEVISION: NOT AT ALL
10. IF YOU CHECKED OFF ANY PROBLEMS, HOW DIFFICULT HAVE THESE PROBLEMS MADE IT FOR YOU TO DO YOUR WORK, TAKE CARE OF THINGS AT HOME, OR GET ALONG WITH OTHER PEOPLE: NOT DIFFICULT AT ALL
2. FEELING DOWN, DEPRESSED OR HOPELESS: SEVERAL DAYS
8. MOVING OR SPEAKING SO SLOWLY THAT OTHER PEOPLE COULD HAVE NOTICED. OR THE OPPOSITE, BEING SO FIGETY OR RESTLESS THAT YOU HAVE BEEN MOVING AROUND A LOT MORE THAN USUAL: NOT AT ALL
SUM OF ALL RESPONSES TO PHQ QUESTIONS 1-9: 6
SUM OF ALL RESPONSES TO PHQ QUESTIONS 1-9: 2
1. LITTLE INTEREST OR PLEASURE IN DOING THINGS: SEVERAL DAYS
2. FEELING DOWN, DEPRESSED OR HOPELESS: SEVERAL DAYS
SUM OF ALL RESPONSES TO PHQ QUESTIONS 1-9: 6
SUM OF ALL RESPONSES TO PHQ QUESTIONS 1-9: 6
SUM OF ALL RESPONSES TO PHQ QUESTIONS 1-9: 2
9. THOUGHTS THAT YOU WOULD BE BETTER OFF DEAD, OR OF HURTING YOURSELF: NOT AT ALL
10. IF YOU CHECKED OFF ANY PROBLEMS, HOW DIFFICULT HAVE THESE PROBLEMS MADE IT FOR YOU TO DO YOUR WORK, TAKE CARE OF THINGS AT HOME, OR GET ALONG WITH OTHER PEOPLE: NOT DIFFICULT AT ALL
2. FEELING DOWN, DEPRESSED OR HOPELESS: SEVERAL DAYS
8. MOVING OR SPEAKING SO SLOWLY THAT OTHER PEOPLE COULD HAVE NOTICED. OR THE OPPOSITE - BEING SO FIDGETY OR RESTLESS THAT YOU HAVE BEEN MOVING AROUND A LOT MORE THAN USUAL: NOT AT ALL
6. FEELING BAD ABOUT YOURSELF - OR THAT YOU ARE A FAILURE OR HAVE LET YOURSELF OR YOUR FAMILY DOWN: NOT AT ALL
SUM OF ALL RESPONSES TO PHQ QUESTIONS 1-9: 2
4. FEELING TIRED OR HAVING LITTLE ENERGY: SEVERAL DAYS
SUM OF ALL RESPONSES TO PHQ QUESTIONS 1-9: 6
6. FEELING BAD ABOUT YOURSELF - OR THAT YOU ARE A FAILURE OR HAVE LET YOURSELF OR YOUR FAMILY DOWN: NOT AT ALL
9. THOUGHTS THAT YOU WOULD BE BETTER OFF DEAD, OR OF HURTING YOURSELF: NOT AT ALL
5. POOR APPETITE OR OVEREATING: NEARLY EVERY DAY
4. FEELING TIRED OR HAVING LITTLE ENERGY: SEVERAL DAYS
SUM OF ALL RESPONSES TO PHQ QUESTIONS 1-9: 6

## 2025-06-01 ASSESSMENT — LIFESTYLE VARIABLES
HOW MANY STANDARD DRINKS CONTAINING ALCOHOL DO YOU HAVE ON A TYPICAL DAY: 1 OR 2
HOW OFTEN DO YOU HAVE SIX OR MORE DRINKS ON ONE OCCASION: 1
HOW OFTEN DO YOU HAVE A DRINK CONTAINING ALCOHOL: 2
HOW MANY STANDARD DRINKS CONTAINING ALCOHOL DO YOU HAVE ON A TYPICAL DAY: 1
HOW OFTEN DO YOU HAVE A DRINK CONTAINING ALCOHOL: MONTHLY OR LESS

## 2025-06-03 ENCOUNTER — OFFICE VISIT (OUTPATIENT)
Age: 85
End: 2025-06-03
Payer: MEDICARE

## 2025-06-03 VITALS
SYSTOLIC BLOOD PRESSURE: 132 MMHG | OXYGEN SATURATION: 97 % | TEMPERATURE: 97.2 F | DIASTOLIC BLOOD PRESSURE: 76 MMHG | BODY MASS INDEX: 28.27 KG/M2 | HEIGHT: 64 IN | WEIGHT: 165.6 LBS | HEART RATE: 94 BPM | RESPIRATION RATE: 18 BRPM

## 2025-06-03 DIAGNOSIS — K59.01 SLOW TRANSIT CONSTIPATION: ICD-10-CM

## 2025-06-03 DIAGNOSIS — R00.1 BRADYCARDIA: ICD-10-CM

## 2025-06-03 DIAGNOSIS — M54.2 NECK PAIN: ICD-10-CM

## 2025-06-03 DIAGNOSIS — K76.0 FATTY LIVER: ICD-10-CM

## 2025-06-03 DIAGNOSIS — Z13.1 SCREENING FOR DIABETES MELLITUS: ICD-10-CM

## 2025-06-03 DIAGNOSIS — R53.81 MALAISE AND FATIGUE: ICD-10-CM

## 2025-06-03 DIAGNOSIS — N39.41 URGE INCONTINENCE OF URINE: ICD-10-CM

## 2025-06-03 DIAGNOSIS — I10 PRIMARY HYPERTENSION: ICD-10-CM

## 2025-06-03 DIAGNOSIS — Z00.00 MEDICARE ANNUAL WELLNESS VISIT, SUBSEQUENT: Primary | ICD-10-CM

## 2025-06-03 DIAGNOSIS — E55.9 HYPOVITAMINOSIS D: ICD-10-CM

## 2025-06-03 DIAGNOSIS — N28.1 RENAL CYST: ICD-10-CM

## 2025-06-03 DIAGNOSIS — R53.83 MALAISE AND FATIGUE: ICD-10-CM

## 2025-06-03 DIAGNOSIS — J30.89 NON-SEASONAL ALLERGIC RHINITIS, UNSPECIFIED TRIGGER: ICD-10-CM

## 2025-06-03 DIAGNOSIS — N18.31 STAGE 3A CHRONIC KIDNEY DISEASE (HCC): ICD-10-CM

## 2025-06-03 PROCEDURE — 3078F DIAST BP <80 MM HG: CPT | Performed by: FAMILY MEDICINE

## 2025-06-03 PROCEDURE — 0509F URINE INCON PLAN DOCD: CPT | Performed by: FAMILY MEDICINE

## 2025-06-03 PROCEDURE — 1090F PRES/ABSN URINE INCON ASSESS: CPT | Performed by: FAMILY MEDICINE

## 2025-06-03 PROCEDURE — 1159F MED LIST DOCD IN RCRD: CPT | Performed by: FAMILY MEDICINE

## 2025-06-03 PROCEDURE — 1123F ACP DISCUSS/DSCN MKR DOCD: CPT | Performed by: FAMILY MEDICINE

## 2025-06-03 PROCEDURE — G8419 CALC BMI OUT NRM PARAM NOF/U: HCPCS | Performed by: FAMILY MEDICINE

## 2025-06-03 PROCEDURE — 3075F SYST BP GE 130 - 139MM HG: CPT | Performed by: FAMILY MEDICINE

## 2025-06-03 PROCEDURE — G0439 PPPS, SUBSEQ VISIT: HCPCS | Performed by: FAMILY MEDICINE

## 2025-06-03 PROCEDURE — G8427 DOCREV CUR MEDS BY ELIG CLIN: HCPCS | Performed by: FAMILY MEDICINE

## 2025-06-03 PROCEDURE — G8400 PT W/DXA NO RESULTS DOC: HCPCS | Performed by: FAMILY MEDICINE

## 2025-06-03 PROCEDURE — 1036F TOBACCO NON-USER: CPT | Performed by: FAMILY MEDICINE

## 2025-06-03 PROCEDURE — 99213 OFFICE O/P EST LOW 20 MIN: CPT | Performed by: FAMILY MEDICINE

## 2025-06-03 RX ORDER — BENAZEPRIL HYDROCHLORIDE 40 MG/1
40 TABLET ORAL DAILY
Qty: 90 TABLET | Refills: 3 | Status: SHIPPED | OUTPATIENT
Start: 2025-06-03

## 2025-06-04 NOTE — PROGRESS NOTES
Identified pt with two pt identifiers(name and )    Chief Complaint   Patient presents with    Medicare AWV        Health Maintenance Due   Topic    Shingles vaccine (1 of 2)    COVID-19 Vaccine ( season)    Annual Wellness Visit (Medicare Advantage)        Wt Readings from Last 3 Encounters:   24 77.1 kg (170 lb)   24 78.8 kg (173 lb 12.8 oz)   24 78.5 kg (173 lb)     Temp Readings from Last 3 Encounters:   24 98.6 °F (37 °C) (Oral)   24 97 °F (36.1 °C) (Temporal)   24 97.7 °F (36.5 °C) (Temporal)     BP Readings from Last 3 Encounters:   24 139/75   24 (!) 152/60   24 (!) 168/82     Pulse Readings from Last 3 Encounters:   24 88   24 52   23 89           Depression Screening:  :         2025     1:32 PM 2025     1:26 PM 3/30/2024     1:31 PM 2024     3:04 PM 2023    10:34 AM 5/10/2023     1:51 PM 2023     1:36 PM   PHQ-9 Questionaire   Little interest or pleasure in doing things 1 1 0 1 1 0 0   Feeling down, depressed, or hopeless 1 1 0 0 1 0 0   Trouble falling or staying asleep, or sleeping too much 0         Feeling tired or having little energy 1         Poor appetite or overeating 3         Feeling bad about yourself - or that you are a failure or have let yourself or your family down 0         Trouble concentrating on things, such as reading the newspaper or watching television 0         Moving or speaking so slowly that other people could have noticed. Or the opposite - being so fidgety or restless that you have been moving around a lot more than usual 0         Thoughts that you would be better off dead, or of hurting yourself in some way 0         PHQ-9 Total Score 6  2  0 1 2 0 0   If you checked off any problems, how difficult have these problems made it for you to do your work, take care of things at home, or get along with other people? 0             Patient-reported        Fall Risk 
appointment with their eye specialist     ADL's:   Patient reports needing help with:  Select all that apply: (!) (Patient-Rptd) Telephone Use, Food Preparation, Taking Medications  Interventions:  Patient advised to follow up in the office for further evaluation and treatment                  Objective   Vitals:    06/03/25 1542   BP: 132/76   BP Site: Right Upper Arm   Patient Position: Sitting   BP Cuff Size: Medium Adult   Pulse: 94   Resp: 18   Temp: 97.2 °F (36.2 °C)   TempSrc: Temporal   SpO2: 97%   Weight: 75.1 kg (165 lb 9.6 oz)   Height: 1.626 m (5' 4\")      Body mass index is 28.43 kg/m².        General Appearance: alert and oriented to person, place and time, well-developed and well nourished, and in no acute distress  Head: normocephalic and atraumatic  Eyes: pupils equal, round, and reactive to light, extraocular eye movements intact, conjunctivae normal  Cardiovascular: normal rate  Abdomen: soft, non-tender, non-distended, normal bowel sounds, no masses or organomegaly            Allergies   Allergen Reactions    Sulfa Antibiotics Rash    Baclofen Other (See Comments)     Adverse actions    Benzalkonium Chloride Dermatitis    Indomethacin      Other reaction(s): Unknown (comments)    Ketorolac Other (See Comments)    Propoxyphene Other (See Comments)     Other reaction(s): Unknown (comments)    Other reaction(s): Other (see comments), Unknown (comments)   Other reaction(s): Unknown (comments)    Adhesive Tape Rash    Bacitracin-Polymyxin B Rash    Penicillins Rash    Wound Dressing Adhesive Rash     Prior to Visit Medications    Medication Sig Taking? Authorizing Provider   benazepril (LOTENSIN) 40 MG tablet Take 1 tablet by mouth daily Yes Letty Waller MD   benazepril (LOTENSIN) 40 MG tablet Take 1 tablet by mouth every morning Yes Letty Waller MD   amLODIPine (NORVASC) 10 MG tablet TAKE 1 TABLET EVERY DAY Yes Letty Waller MD   busPIRone (BUSPAR) 5 MG tablet TAKE 1 TABLET TWICE DAILY Yes

## 2025-06-10 ENCOUNTER — LAB (OUTPATIENT)
Age: 85
End: 2025-06-10

## 2025-06-10 DIAGNOSIS — R53.81 MALAISE AND FATIGUE: ICD-10-CM

## 2025-06-10 DIAGNOSIS — R53.83 MALAISE AND FATIGUE: ICD-10-CM

## 2025-06-10 DIAGNOSIS — Z13.1 SCREENING FOR DIABETES MELLITUS: ICD-10-CM

## 2025-06-10 DIAGNOSIS — E55.9 HYPOVITAMINOSIS D: ICD-10-CM

## 2025-06-10 DIAGNOSIS — N18.31 STAGE 3A CHRONIC KIDNEY DISEASE (HCC): ICD-10-CM

## 2025-06-11 ENCOUNTER — RESULTS FOLLOW-UP (OUTPATIENT)
Age: 85
End: 2025-06-11

## 2025-06-11 LAB
25(OH)D3 SERPL-MCNC: 61.8 NG/ML (ref 30–100)
APPEARANCE UR: CLEAR
BACTERIA URNS QL MICRO: NEGATIVE /HPF
BILIRUB UR QL: NEGATIVE
COLOR UR: ABNORMAL
EPITH CASTS URNS QL MICRO: ABNORMAL /LPF
EST. AVERAGE GLUCOSE BLD GHB EST-MCNC: 103 MG/DL
GLUCOSE UR STRIP.AUTO-MCNC: NEGATIVE MG/DL
HBA1C MFR BLD: 5.2 % (ref 4–5.6)
HGB UR QL STRIP: NEGATIVE
HYALINE CASTS URNS QL MICRO: ABNORMAL /LPF (ref 0–5)
KETONES UR QL STRIP.AUTO: NEGATIVE MG/DL
LEUKOCYTE ESTERASE UR QL STRIP.AUTO: ABNORMAL
NITRITE UR QL STRIP.AUTO: NEGATIVE
PH UR STRIP: 5.5 (ref 5–8)
PROT UR STRIP-MCNC: NEGATIVE MG/DL
RBC #/AREA URNS HPF: ABNORMAL /HPF (ref 0–5)
SP GR UR REFRACTOMETRY: 1.02 (ref 1–1.03)
SPECIMEN HOLD: NORMAL
T4 FREE SERPL-MCNC: 1 NG/DL (ref 0.8–1.5)
TSH SERPL DL<=0.05 MIU/L-ACNC: 1.65 UIU/ML (ref 0.36–3.74)
UROBILINOGEN UR QL STRIP.AUTO: 0.2 EU/DL (ref 0.2–1)
WBC URNS QL MICRO: ABNORMAL /HPF (ref 0–4)

## 2025-06-20 ENCOUNTER — HOSPITAL ENCOUNTER (OUTPATIENT)
Facility: HOSPITAL | Age: 85
Discharge: HOME | End: 2025-06-23
Attending: FAMILY MEDICINE
Payer: MEDICARE

## 2025-06-20 DIAGNOSIS — M54.2 NECK PAIN: ICD-10-CM

## 2025-06-20 PROCEDURE — 72040 X-RAY EXAM NECK SPINE 2-3 VW: CPT

## 2025-06-27 ENCOUNTER — OFFICE VISIT (OUTPATIENT)
Age: 85
End: 2025-06-27

## 2025-06-27 ENCOUNTER — RESULTS FOLLOW-UP (OUTPATIENT)
Age: 85
End: 2025-06-27

## 2025-06-27 VITALS
DIASTOLIC BLOOD PRESSURE: 73 MMHG | BODY MASS INDEX: 28 KG/M2 | TEMPERATURE: 98.1 F | HEIGHT: 64 IN | WEIGHT: 164 LBS | RESPIRATION RATE: 14 BRPM | SYSTOLIC BLOOD PRESSURE: 111 MMHG | HEART RATE: 107 BPM | OXYGEN SATURATION: 94 %

## 2025-06-27 DIAGNOSIS — J98.8 WHEEZING-ASSOCIATED RESPIRATORY INFECTION (WARI): Primary | ICD-10-CM

## 2025-06-27 RX ORDER — IPRATROPIUM BROMIDE AND ALBUTEROL SULFATE 2.5; .5 MG/3ML; MG/3ML
1 SOLUTION RESPIRATORY (INHALATION) ONCE
Status: COMPLETED | OUTPATIENT
Start: 2025-06-27 | End: 2025-06-27

## 2025-06-27 RX ORDER — BENZONATATE 200 MG/1
200 CAPSULE ORAL 3 TIMES DAILY PRN
Qty: 21 CAPSULE | Refills: 0 | Status: SHIPPED | OUTPATIENT
Start: 2025-06-27 | End: 2025-07-04

## 2025-06-27 RX ORDER — ALBUTEROL SULFATE 90 UG/1
2 INHALANT RESPIRATORY (INHALATION) EVERY 6 HOURS PRN
Qty: 18 G | Refills: 0 | Status: SHIPPED | OUTPATIENT
Start: 2025-06-27

## 2025-06-27 RX ORDER — PREDNISONE 20 MG/1
TABLET ORAL
Qty: 10 TABLET | Refills: 0 | Status: SHIPPED | OUTPATIENT
Start: 2025-06-27

## 2025-06-27 RX ORDER — DOXYCYCLINE HYCLATE 100 MG
100 TABLET ORAL 2 TIMES DAILY
Qty: 14 TABLET | Refills: 0 | Status: SHIPPED | OUTPATIENT
Start: 2025-06-27 | End: 2025-07-04

## 2025-06-27 RX ORDER — DEXAMETHASONE SODIUM PHOSPHATE 10 MG/ML
10 INJECTION, SOLUTION INTRA-ARTICULAR; INTRALESIONAL; INTRAMUSCULAR; INTRAVENOUS; SOFT TISSUE ONCE
Status: COMPLETED | OUTPATIENT
Start: 2025-06-27 | End: 2025-06-27

## 2025-06-27 RX ADMIN — DEXAMETHASONE SODIUM PHOSPHATE 10 MG: 10 INJECTION, SOLUTION INTRA-ARTICULAR; INTRALESIONAL; INTRAMUSCULAR; INTRAVENOUS; SOFT TISSUE at 11:05

## 2025-06-27 RX ADMIN — IPRATROPIUM BROMIDE AND ALBUTEROL SULFATE 1 DOSE: 2.5; .5 SOLUTION RESPIRATORY (INHALATION) at 11:06

## 2025-06-27 ASSESSMENT — ENCOUNTER SYMPTOMS
GASTROINTESTINAL NEGATIVE: 1
EYES NEGATIVE: 1
ALLERGIC/IMMUNOLOGIC NEGATIVE: 1
COUGH: 1
SHORTNESS OF BREATH: 1

## 2025-06-27 NOTE — PROGRESS NOTES
2025   Dano Ritter (: 1940) is a 85 y.o. female, New patient, here for evaluation of the following chief complaint(s):  Cough (Pt c/o cough causing R rib pain, congestion, fatigue, discolored mucus ongoing for one week. She's taken mucinex with no relief.)     ASSESSMENT/PLAN:  Below is the assessment and plan developed based on review of pertinent history, physical exam, labs, studies, and medications.         Assessment & Plan  Wheezing-associated respiratory infection (WARI)  No radiology/radiology tech available here in clinic today.  Offered patient chest x-ray order to be sent to other urgent care or [facility], daughter and patient declined.  Declined COVID/flu testing.  Patient with wheezing and rhonchi noted on auscultation, patient at risk to develop pneumonia given age, core morbidities and productive cough, shortness of breath and fatigue x 1.  Will empirically treat with doxycycline x 1 week, advised patient and daughter if she experience any new or worsening symptoms to seek further medical attention in the emergency room.  Also advised to follow-up with PCP in 1 week.  Patient treated with DuoNeb here in clinic and reports some improvement    Orders:    ipratropium 0.5 mg-albuterol 2.5 mg (DUONEB) nebulizer solution 1 Dose    dexAMETHasone (DECADRON) injection 10 mg      Handout given with care instructions  OTC for symptom management. Increase fluid intake, ensure adequate nutritional intake.  Follow up with PCP as needed.  Go to ED with development of any acute symptoms.     Follow up:  No follow-ups on file.  Follow up immediately for any new, worsening or changes or if symptoms are not improving over the next 5-7 days.     SUBJECTIVE/OBJECTIVE:    Limitation to History: None    Outside Historian: None    External Records Reviewed: None    SUBJECTIVE/OBJECTIVE:  Dano Ritter is a 85 y.o. female presents with complaint of cough, congestion, R rib pain when coughing,

## 2025-07-07 ENCOUNTER — OFFICE VISIT (OUTPATIENT)
Age: 85
End: 2025-07-07

## 2025-07-07 VITALS
RESPIRATION RATE: 18 BRPM | HEART RATE: 82 BPM | HEIGHT: 63 IN | DIASTOLIC BLOOD PRESSURE: 72 MMHG | SYSTOLIC BLOOD PRESSURE: 110 MMHG | OXYGEN SATURATION: 96 % | WEIGHT: 161.2 LBS | TEMPERATURE: 97.8 F | BODY MASS INDEX: 28.56 KG/M2

## 2025-07-07 DIAGNOSIS — B37.0 THRUSH, ORAL: Primary | ICD-10-CM

## 2025-07-07 RX ORDER — FLUCONAZOLE 150 MG/1
150 TABLET ORAL ONCE
Qty: 1 TABLET | Refills: 0 | Status: SHIPPED | OUTPATIENT
Start: 2025-07-07 | End: 2025-07-07

## 2025-07-07 RX ORDER — NYSTATIN 100000 [USP'U]/ML
500000 SUSPENSION ORAL 4 TIMES DAILY
Qty: 100 ML | Refills: 0 | Status: SHIPPED | OUTPATIENT
Start: 2025-07-07

## 2025-07-07 ASSESSMENT — ENCOUNTER SYMPTOMS
RESPIRATORY NEGATIVE: 1
ALLERGIC/IMMUNOLOGIC NEGATIVE: 1
GASTROINTESTINAL NEGATIVE: 1
EYES NEGATIVE: 1

## 2025-07-07 NOTE — PROGRESS NOTES
2025   Dano Ritter (: 1940) is a 85 y.o. female, here for evaluation of the following chief complaint(s):  Follow-up (Pt in office today for an WARI follow up, Pt states she is feeling better due to prescribed medication. New complaint per pt daughter pt taste is off.)     ASSESSMENT/PLAN:  Below is the assessment and plan developed based on review of pertinent history, physical exam, labs, studies, and medications.         Assessment & Plan  Thrush, oral  Patient recently completed antibiotics more than likely contributed to causing thrush.  Will treat patient and discussed plan with patient and daughter which they agree.    Patient BP hypotensive on initial arrival and after patient sitting for several minutes and improved.  Patient denies any dizziness, chest pain or lightheadedness BP now 110/72 prior to discharge, patient and daughter agree with discharge plan given stabilization of BP.      Discharge paperwork given with care instructions  OTC for symptom management. Increase fluid intake, ensure adequate nutritional intake.  Follow up with PCP within 7 days or sooner if indicated.   Go to ED with development of any new or worsening acute symptoms, chest pain, shortness of breath, uncontrolled fever.     Follow up:  No follow-ups on file.  Follow up immediately for any new, worsening or changes or if symptoms are not improving over the next 5-7 days.     SUBJECTIVE/OBJECTIVE:    Limitation to History: None    Outside Historian: None    External Records Reviewed: None    SUBJECTIVE/OBJECTIVE:    HPI       Follow-up (Pt in office today for an WARI follow up, Pt states she is feeling better due to prescribed medication. New complaint per pt daughter pt taste is off.)        Review of Systems   Constitutional:  Positive for fatigue.   HENT:          +white on tongue    Eyes: Negative.    Respiratory: Negative.     Cardiovascular: Negative.    Gastrointestinal: Negative.    Endocrine: Negative.

## 2025-07-31 RX ORDER — SOLIFENACIN SUCCINATE 5 MG/1
5 TABLET, FILM COATED ORAL DAILY
Qty: 90 TABLET | Refills: 3 | Status: SHIPPED | OUTPATIENT
Start: 2025-07-31

## 2025-08-17 ENCOUNTER — TELEPHONE (OUTPATIENT)
Age: 85
End: 2025-08-17

## 2025-08-21 ENCOUNTER — HOSPITAL ENCOUNTER (INPATIENT)
Facility: HOSPITAL | Age: 85
LOS: 1 days | Discharge: HOME | DRG: 309 | End: 2025-08-22
Attending: EMERGENCY MEDICINE | Admitting: INTERNAL MEDICINE
Payer: MEDICARE

## 2025-08-21 ENCOUNTER — APPOINTMENT (OUTPATIENT)
Facility: HOSPITAL | Age: 85
DRG: 309 | End: 2025-08-21
Payer: MEDICARE

## 2025-08-21 ENCOUNTER — OFFICE VISIT (OUTPATIENT)
Age: 85
End: 2025-08-21
Payer: MEDICARE

## 2025-08-21 VITALS
TEMPERATURE: 97.6 F | RESPIRATION RATE: 18 BRPM | HEART RATE: 98 BPM | DIASTOLIC BLOOD PRESSURE: 80 MMHG | WEIGHT: 163.4 LBS | SYSTOLIC BLOOD PRESSURE: 140 MMHG | OXYGEN SATURATION: 98 % | BODY MASS INDEX: 27.9 KG/M2 | HEIGHT: 64 IN

## 2025-08-21 DIAGNOSIS — I48.91 ATRIAL FIBRILLATION, UNSPECIFIED TYPE (HCC): ICD-10-CM

## 2025-08-21 DIAGNOSIS — I49.9 IRREGULAR HEART RHYTHM: ICD-10-CM

## 2025-08-21 DIAGNOSIS — I48.92 ATRIAL FLUTTER WITH RAPID VENTRICULAR RESPONSE (HCC): Primary | ICD-10-CM

## 2025-08-21 DIAGNOSIS — I95.9 HYPOTENSION, UNSPECIFIED HYPOTENSION TYPE: Primary | ICD-10-CM

## 2025-08-21 LAB
ANION GAP SERPL CALC-SCNC: 14 MMOL/L (ref 2–14)
BASOPHILS # BLD: 0.07 K/UL (ref 0–0.1)
BASOPHILS NFR BLD: 1.1 % (ref 0–1)
BUN SERPL-MCNC: 33 MG/DL (ref 8–23)
BUN/CREAT SERPL: 25 (ref 12–20)
CALCIUM SERPL-MCNC: 10.1 MG/DL (ref 8.8–10.2)
CHLORIDE SERPL-SCNC: 103 MMOL/L (ref 98–107)
CO2 SERPL-SCNC: 23 MMOL/L (ref 20–29)
COMMENT:: NORMAL
COMMENT:: NORMAL
CREAT SERPL-MCNC: 1.31 MG/DL (ref 0.6–1)
DIFFERENTIAL METHOD BLD: ABNORMAL
EOSINOPHIL # BLD: 0.16 K/UL (ref 0–0.4)
EOSINOPHIL NFR BLD: 2.5 % (ref 0–7)
ERYTHROCYTE [DISTWIDTH] IN BLOOD BY AUTOMATED COUNT: 16.3 % (ref 11.5–14.5)
GLUCOSE SERPL-MCNC: 87 MG/DL (ref 65–100)
HCT VFR BLD AUTO: 42.8 % (ref 35–47)
HGB BLD-MCNC: 13.9 G/DL (ref 11.5–16)
IMM GRANULOCYTES # BLD AUTO: 0.01 K/UL (ref 0–0.04)
IMM GRANULOCYTES NFR BLD AUTO: 0.2 % (ref 0–0.5)
LYMPHOCYTES # BLD: 2.19 K/UL (ref 0.8–3.5)
LYMPHOCYTES NFR BLD: 33.8 % (ref 12–49)
MAGNESIUM SERPL-MCNC: 2.2 MG/DL (ref 1.6–2.4)
MCH RBC QN AUTO: 28.5 PG (ref 26–34)
MCHC RBC AUTO-ENTMCNC: 32.5 G/DL (ref 30–36.5)
MCV RBC AUTO: 87.7 FL (ref 80–99)
MONOCYTES # BLD: 0.6 K/UL (ref 0–1)
MONOCYTES NFR BLD: 9.3 % (ref 5–13)
NEUTS SEG # BLD: 3.44 K/UL (ref 1.8–8)
NEUTS SEG NFR BLD: 53.1 % (ref 32–75)
NRBC # BLD: 0 K/UL (ref 0–0.01)
NRBC BLD-RTO: 0 PER 100 WBC
NT PRO BNP: 1737 PG/ML (ref 0–450)
PLATELET # BLD AUTO: 168 K/UL (ref 150–400)
PMV BLD AUTO: 11.2 FL (ref 8.9–12.9)
POTASSIUM SERPL-SCNC: 4.2 MMOL/L (ref 3.5–5.1)
RBC # BLD AUTO: 4.88 M/UL (ref 3.8–5.2)
SODIUM SERPL-SCNC: 140 MMOL/L (ref 136–145)
SPECIMEN HOLD: NORMAL
SPECIMEN HOLD: NORMAL
T4 FREE SERPL-MCNC: 1.2 NG/DL (ref 0.9–1.6)
TROPONIN T SERPL HS-MCNC: 19.4 NG/L (ref 0–14)
TSH, 3RD GENERATION: 2.2 UIU/ML (ref 0.27–4.2)
WBC # BLD AUTO: 6.5 K/UL (ref 3.6–11)

## 2025-08-21 PROCEDURE — 96368 THER/DIAG CONCURRENT INF: CPT

## 2025-08-21 PROCEDURE — G2211 COMPLEX E/M VISIT ADD ON: HCPCS | Performed by: FAMILY MEDICINE

## 2025-08-21 PROCEDURE — 6360000002 HC RX W HCPCS: Performed by: EMERGENCY MEDICINE

## 2025-08-21 PROCEDURE — 99213 OFFICE O/P EST LOW 20 MIN: CPT | Performed by: FAMILY MEDICINE

## 2025-08-21 PROCEDURE — 3075F SYST BP GE 130 - 139MM HG: CPT | Performed by: FAMILY MEDICINE

## 2025-08-21 PROCEDURE — 93005 ELECTROCARDIOGRAM TRACING: CPT | Performed by: EMERGENCY MEDICINE

## 2025-08-21 PROCEDURE — 85610 PROTHROMBIN TIME: CPT

## 2025-08-21 PROCEDURE — 2580000003 HC RX 258: Performed by: EMERGENCY MEDICINE

## 2025-08-21 PROCEDURE — G8419 CALC BMI OUT NRM PARAM NOF/U: HCPCS | Performed by: FAMILY MEDICINE

## 2025-08-21 PROCEDURE — 99285 EMERGENCY DEPT VISIT HI MDM: CPT

## 2025-08-21 PROCEDURE — 3079F DIAST BP 80-89 MM HG: CPT | Performed by: FAMILY MEDICINE

## 2025-08-21 PROCEDURE — G8400 PT W/DXA NO RESULTS DOC: HCPCS | Performed by: FAMILY MEDICINE

## 2025-08-21 PROCEDURE — 96372 THER/PROPH/DIAG INJ SC/IM: CPT

## 2025-08-21 PROCEDURE — 70450 CT HEAD/BRAIN W/O DYE: CPT

## 2025-08-21 PROCEDURE — 71045 X-RAY EXAM CHEST 1 VIEW: CPT

## 2025-08-21 PROCEDURE — 1090F PRES/ABSN URINE INCON ASSESS: CPT | Performed by: FAMILY MEDICINE

## 2025-08-21 PROCEDURE — 96366 THER/PROPH/DIAG IV INF ADDON: CPT

## 2025-08-21 PROCEDURE — 1160F RVW MEDS BY RX/DR IN RCRD: CPT | Performed by: FAMILY MEDICINE

## 2025-08-21 PROCEDURE — 1159F MED LIST DOCD IN RCRD: CPT | Performed by: FAMILY MEDICINE

## 2025-08-21 PROCEDURE — 83880 ASSAY OF NATRIURETIC PEPTIDE: CPT

## 2025-08-21 PROCEDURE — 1036F TOBACCO NON-USER: CPT | Performed by: FAMILY MEDICINE

## 2025-08-21 PROCEDURE — 1123F ACP DISCUSS/DSCN MKR DOCD: CPT | Performed by: FAMILY MEDICINE

## 2025-08-21 PROCEDURE — 84439 ASSAY OF FREE THYROXINE: CPT

## 2025-08-21 PROCEDURE — 6370000000 HC RX 637 (ALT 250 FOR IP): Performed by: EMERGENCY MEDICINE

## 2025-08-21 PROCEDURE — 93005 ELECTROCARDIOGRAM TRACING: CPT | Performed by: FAMILY MEDICINE

## 2025-08-21 PROCEDURE — 80048 BASIC METABOLIC PNL TOTAL CA: CPT

## 2025-08-21 PROCEDURE — 85520 HEPARIN ASSAY: CPT

## 2025-08-21 PROCEDURE — 84443 ASSAY THYROID STIM HORMONE: CPT

## 2025-08-21 PROCEDURE — G8427 DOCREV CUR MEDS BY ELIG CLIN: HCPCS | Performed by: FAMILY MEDICINE

## 2025-08-21 PROCEDURE — 84484 ASSAY OF TROPONIN QUANT: CPT

## 2025-08-21 PROCEDURE — 96365 THER/PROPH/DIAG IV INF INIT: CPT

## 2025-08-21 PROCEDURE — 93010 ELECTROCARDIOGRAM REPORT: CPT | Performed by: FAMILY MEDICINE

## 2025-08-21 PROCEDURE — 85025 COMPLETE CBC W/AUTO DIFF WBC: CPT

## 2025-08-21 PROCEDURE — G0378 HOSPITAL OBSERVATION PER HR: HCPCS

## 2025-08-21 PROCEDURE — 83735 ASSAY OF MAGNESIUM: CPT

## 2025-08-21 PROCEDURE — 85730 THROMBOPLASTIN TIME PARTIAL: CPT

## 2025-08-21 RX ORDER — MAGNESIUM SULFATE IN WATER 40 MG/ML
2000 INJECTION, SOLUTION INTRAVENOUS PRN
Status: DISCONTINUED | OUTPATIENT
Start: 2025-08-21 | End: 2025-08-22 | Stop reason: HOSPADM

## 2025-08-21 RX ORDER — ONDANSETRON 4 MG/1
4 TABLET, ORALLY DISINTEGRATING ORAL EVERY 8 HOURS PRN
Status: DISCONTINUED | OUTPATIENT
Start: 2025-08-21 | End: 2025-08-22 | Stop reason: HOSPADM

## 2025-08-21 RX ORDER — ONDANSETRON 2 MG/ML
4 INJECTION INTRAMUSCULAR; INTRAVENOUS EVERY 6 HOURS PRN
Status: DISCONTINUED | OUTPATIENT
Start: 2025-08-21 | End: 2025-08-22 | Stop reason: HOSPADM

## 2025-08-21 RX ORDER — ASPIRIN 325 MG
325 TABLET ORAL
Status: COMPLETED | OUTPATIENT
Start: 2025-08-21 | End: 2025-08-21

## 2025-08-21 RX ORDER — ENOXAPARIN SODIUM 100 MG/ML
1 INJECTION SUBCUTANEOUS 2 TIMES DAILY
Status: DISCONTINUED | OUTPATIENT
Start: 2025-08-22 | End: 2025-08-22

## 2025-08-21 RX ORDER — POLYETHYLENE GLYCOL 3350 17 G/17G
17 POWDER, FOR SOLUTION ORAL DAILY PRN
Status: DISCONTINUED | OUTPATIENT
Start: 2025-08-21 | End: 2025-08-22 | Stop reason: HOSPADM

## 2025-08-21 RX ORDER — SODIUM CHLORIDE 9 MG/ML
INJECTION, SOLUTION INTRAVENOUS PRN
Status: DISCONTINUED | OUTPATIENT
Start: 2025-08-21 | End: 2025-08-22 | Stop reason: HOSPADM

## 2025-08-21 RX ORDER — HEPARIN SODIUM 1000 [USP'U]/ML
4000 INJECTION, SOLUTION INTRAVENOUS; SUBCUTANEOUS PRN
Status: DISCONTINUED | OUTPATIENT
Start: 2025-08-21 | End: 2025-08-21

## 2025-08-21 RX ORDER — SODIUM CHLORIDE 0.9 % (FLUSH) 0.9 %
5-40 SYRINGE (ML) INJECTION PRN
Status: DISCONTINUED | OUTPATIENT
Start: 2025-08-21 | End: 2025-08-22 | Stop reason: HOSPADM

## 2025-08-21 RX ORDER — ACETAMINOPHEN 325 MG/1
650 TABLET ORAL EVERY 6 HOURS PRN
Status: DISCONTINUED | OUTPATIENT
Start: 2025-08-21 | End: 2025-08-22 | Stop reason: HOSPADM

## 2025-08-21 RX ORDER — DILTIAZEM HYDROCHLORIDE 5 MG/ML
15 INJECTION INTRAVENOUS ONCE
Status: COMPLETED | OUTPATIENT
Start: 2025-08-21 | End: 2025-08-21

## 2025-08-21 RX ORDER — ACETAMINOPHEN 650 MG/1
650 SUPPOSITORY RECTAL EVERY 6 HOURS PRN
Status: DISCONTINUED | OUTPATIENT
Start: 2025-08-21 | End: 2025-08-22 | Stop reason: HOSPADM

## 2025-08-21 RX ORDER — POTASSIUM CHLORIDE 750 MG/1
40 TABLET, EXTENDED RELEASE ORAL PRN
Status: DISCONTINUED | OUTPATIENT
Start: 2025-08-21 | End: 2025-08-22 | Stop reason: HOSPADM

## 2025-08-21 RX ORDER — SODIUM CHLORIDE 0.9 % (FLUSH) 0.9 %
5-40 SYRINGE (ML) INJECTION EVERY 12 HOURS SCHEDULED
Status: DISCONTINUED | OUTPATIENT
Start: 2025-08-21 | End: 2025-08-22 | Stop reason: HOSPADM

## 2025-08-21 RX ORDER — ENOXAPARIN SODIUM 100 MG/ML
1 INJECTION SUBCUTANEOUS
Status: COMPLETED | OUTPATIENT
Start: 2025-08-21 | End: 2025-08-21

## 2025-08-21 RX ORDER — MAGNESIUM SULFATE IN WATER 40 MG/ML
2000 INJECTION, SOLUTION INTRAVENOUS ONCE
Status: COMPLETED | OUTPATIENT
Start: 2025-08-21 | End: 2025-08-21

## 2025-08-21 RX ORDER — POTASSIUM CHLORIDE 7.45 MG/ML
10 INJECTION INTRAVENOUS PRN
Status: DISCONTINUED | OUTPATIENT
Start: 2025-08-21 | End: 2025-08-22 | Stop reason: HOSPADM

## 2025-08-21 RX ORDER — HEPARIN SODIUM 1000 [USP'U]/ML
2000 INJECTION, SOLUTION INTRAVENOUS; SUBCUTANEOUS PRN
Status: DISCONTINUED | OUTPATIENT
Start: 2025-08-21 | End: 2025-08-21

## 2025-08-21 RX ADMIN — DILTIAZEM HYDROCHLORIDE 15 MG: 5 INJECTION, SOLUTION INTRAVENOUS at 18:34

## 2025-08-21 RX ADMIN — ASPIRIN 325 MG: 325 TABLET ORAL at 20:17

## 2025-08-21 RX ADMIN — ENOXAPARIN SODIUM 70 MG: 100 INJECTION SUBCUTANEOUS at 20:19

## 2025-08-21 RX ADMIN — MAGNESIUM SULFATE HEPTAHYDRATE 2000 MG: 40 INJECTION, SOLUTION INTRAVENOUS at 18:50

## 2025-08-21 RX ADMIN — DILTIAZEM HYDROCHLORIDE 5 MG/HR: 5 INJECTION, SOLUTION INTRAVENOUS at 18:43

## 2025-08-21 ASSESSMENT — ENCOUNTER SYMPTOMS
VOMITING: 0
ABDOMINAL PAIN: 0
COUGH: 0
DIARRHEA: 0
ANAL BLEEDING: 0
NAUSEA: 0
BLOOD IN STOOL: 0
SHORTNESS OF BREATH: 0
ABDOMINAL DISTENTION: 0

## 2025-08-21 ASSESSMENT — LIFESTYLE VARIABLES
HOW OFTEN DO YOU HAVE A DRINK CONTAINING ALCOHOL: NEVER
HOW MANY STANDARD DRINKS CONTAINING ALCOHOL DO YOU HAVE ON A TYPICAL DAY: PATIENT DOES NOT DRINK

## 2025-08-21 ASSESSMENT — PAIN SCALES - GENERAL
PAINLEVEL_OUTOF10: 0
PAINLEVEL_OUTOF10: 0

## 2025-08-21 ASSESSMENT — PAIN - FUNCTIONAL ASSESSMENT: PAIN_FUNCTIONAL_ASSESSMENT: 0-10

## 2025-08-22 ENCOUNTER — APPOINTMENT (OUTPATIENT)
Facility: HOSPITAL | Age: 85
DRG: 309 | End: 2025-08-22
Attending: EMERGENCY MEDICINE
Payer: MEDICARE

## 2025-08-22 VITALS
TEMPERATURE: 97.9 F | WEIGHT: 166.45 LBS | BODY MASS INDEX: 28.42 KG/M2 | DIASTOLIC BLOOD PRESSURE: 79 MMHG | OXYGEN SATURATION: 98 % | HEART RATE: 97 BPM | HEIGHT: 64 IN | RESPIRATION RATE: 18 BRPM | SYSTOLIC BLOOD PRESSURE: 129 MMHG

## 2025-08-22 PROBLEM — I48.91 ATRIAL FIBRILLATION (HCC): Status: ACTIVE | Noted: 2025-08-22

## 2025-08-22 LAB
ALBUMIN SERPL-MCNC: 3.3 G/DL (ref 3.5–5.2)
ALBUMIN/GLOB SERPL: 1.2 (ref 1.1–2.2)
ALP SERPL-CCNC: 77 U/L (ref 35–104)
ALT SERPL-CCNC: 37 U/L (ref 10–35)
ANION GAP SERPL CALC-SCNC: 13 MMOL/L (ref 2–14)
APTT PPP: 31.6 SEC (ref 22.1–31)
AST SERPL-CCNC: 31 U/L (ref 10–35)
BILIRUB SERPL-MCNC: 0.5 MG/DL (ref 0–1.2)
BUN SERPL-MCNC: 29 MG/DL (ref 8–23)
BUN/CREAT SERPL: 29 (ref 12–20)
CALCIUM SERPL-MCNC: 9.5 MG/DL (ref 8.8–10.2)
CHLORIDE SERPL-SCNC: 106 MMOL/L (ref 98–107)
CO2 SERPL-SCNC: 22 MMOL/L (ref 20–29)
CREAT SERPL-MCNC: 0.98 MG/DL (ref 0.6–1)
ECHO AO ROOT DIAM: 3 CM
ECHO AO ROOT INDEX: 1.66 CM/M2
ECHO AV AREA PEAK VELOCITY: 1.7 CM2
ECHO AV AREA VTI: 1.8 CM2
ECHO AV AREA/BSA PEAK VELOCITY: 0.9 CM2/M2
ECHO AV AREA/BSA VTI: 1 CM2/M2
ECHO AV MEAN GRADIENT: 2 MMHG
ECHO AV MEAN VELOCITY: 0.7 M/S
ECHO AV PEAK GRADIENT: 5 MMHG
ECHO AV PEAK VELOCITY: 1.1 M/S
ECHO AV VELOCITY RATIO: 0.64
ECHO AV VTI: 19.8 CM
ECHO BSA: 1.85 M2
ECHO EST RA PRESSURE: 3 MMHG
ECHO LA DIAMETER INDEX: 2.65 CM/M2
ECHO LA DIAMETER: 4.8 CM
ECHO LA TO AORTIC ROOT RATIO: 1.6
ECHO LA VOL A-L A2C: 173 ML (ref 22–52)
ECHO LA VOL A-L A4C: 83 ML (ref 22–52)
ECHO LA VOL BP: 116 ML (ref 22–52)
ECHO LA VOL MOD A2C: 169 ML (ref 22–52)
ECHO LA VOL MOD A4C: 76 ML (ref 22–52)
ECHO LA VOL/BSA BIPLANE: 64 ML/M2 (ref 16–34)
ECHO LA VOLUME AREA LENGTH: 122 ML
ECHO LA VOLUME INDEX A-L A2C: 96 ML/M2 (ref 16–34)
ECHO LA VOLUME INDEX A-L A4C: 46 ML/M2 (ref 16–34)
ECHO LA VOLUME INDEX AREA LENGTH: 67 ML/M2 (ref 16–34)
ECHO LA VOLUME INDEX MOD A2C: 93 ML/M2 (ref 16–34)
ECHO LA VOLUME INDEX MOD A4C: 42 ML/M2 (ref 16–34)
ECHO LV E' LATERAL VELOCITY: 13.75 CM/S
ECHO LV E' SEPTAL VELOCITY: 7.32 CM/S
ECHO LV EF PHYSICIAN: 60 %
ECHO LV FRACTIONAL SHORTENING: 35 % (ref 28–44)
ECHO LV INTERNAL DIMENSION DIASTOLE INDEX: 1.88 CM/M2
ECHO LV INTERNAL DIMENSION DIASTOLIC: 3.4 CM (ref 3.9–5.3)
ECHO LV INTERNAL DIMENSION SYSTOLIC INDEX: 1.22 CM/M2
ECHO LV INTERNAL DIMENSION SYSTOLIC: 2.2 CM
ECHO LV IVSD: 1.1 CM (ref 0.6–0.9)
ECHO LV MASS 2D: 114 G (ref 67–162)
ECHO LV MASS INDEX 2D: 63 G/M2 (ref 43–95)
ECHO LV POSTERIOR WALL DIASTOLIC: 1.1 CM (ref 0.6–0.9)
ECHO LV RELATIVE WALL THICKNESS RATIO: 0.65
ECHO LVOT AREA: 2.5 CM2
ECHO LVOT AV VTI INDEX: 0.71
ECHO LVOT DIAM: 1.8 CM
ECHO LVOT MEAN GRADIENT: 1 MMHG
ECHO LVOT PEAK GRADIENT: 2 MMHG
ECHO LVOT PEAK VELOCITY: 0.7 M/S
ECHO LVOT STROKE VOLUME INDEX: 19.8 ML/M2
ECHO LVOT SV: 35.9 ML
ECHO LVOT VTI: 14.1 CM
ECHO MV AREA VTI: 1.5 CM2
ECHO MV LVOT VTI INDEX: 1.66
ECHO MV MAX VELOCITY: 1.4 M/S
ECHO MV MEAN GRADIENT: 2 MMHG
ECHO MV MEAN VELOCITY: 0.7 M/S
ECHO MV PEAK GRADIENT: 8 MMHG
ECHO MV REGURGITANT PEAK GRADIENT: 159 MMHG
ECHO MV REGURGITANT PEAK VELOCITY: 6.3 M/S
ECHO MV VTI: 23.4 CM
ECHO RA VOLUME: 87 ML
ECHO RA VOLUME: 88 ML
ECHO RIGHT VENTRICULAR SYSTOLIC PRESSURE (RVSP): 35 MMHG
ECHO RV FREE WALL PEAK S': 10.5 CM/S
ECHO RV TAPSE: 1.7 CM (ref 1.7–?)
ECHO TV REGURGITANT MAX VELOCITY: 2.81 M/S
ECHO TV REGURGITANT PEAK GRADIENT: 32 MMHG
EKG ATRIAL RATE: 322 BPM
EKG ATRIAL RATE: 352 BPM
EKG DIAGNOSIS: NORMAL
EKG DIAGNOSIS: NORMAL
EKG Q-T INTERVAL: 302 MS
EKG Q-T INTERVAL: 360 MS
EKG QRS DURATION: 76 MS
EKG QRS DURATION: 80 MS
EKG QTC CALCULATION (BAZETT): 412 MS
EKG QTC CALCULATION (BAZETT): 445 MS
EKG R AXIS: 67 DEGREES
EKG R AXIS: 96 DEGREES
EKG T AXIS: 269 DEGREES
EKG T AXIS: 79 DEGREES
EKG VENTRICULAR RATE: 112 BPM
EKG VENTRICULAR RATE: 92 BPM
GLOBULIN SER CALC-MCNC: 2.8 G/DL (ref 2–4)
GLUCOSE SERPL-MCNC: 109 MG/DL (ref 65–100)
INR PPP: 1.1 (ref 0.9–1.1)
INR PPP: 1.1 (ref 0.9–1.1)
LACTATE SERPL-SCNC: 1 MMOL/L (ref 0.5–2)
MAGNESIUM SERPL-MCNC: 2.5 MG/DL (ref 1.6–2.4)
PHOSPHATE SERPL-MCNC: 3.2 MG/DL (ref 2.4–4.5)
POTASSIUM SERPL-SCNC: 3.6 MMOL/L (ref 3.5–5.1)
PROT SERPL-MCNC: 6.1 G/DL (ref 6.4–8.3)
PROTHROMBIN TIME: 11.9 SEC (ref 9.2–11.2)
PROTHROMBIN TIME: 11.9 SEC (ref 9.2–11.2)
SODIUM SERPL-SCNC: 140 MMOL/L (ref 136–145)
T4 FREE SERPL-MCNC: 1.1 NG/DL (ref 0.9–1.6)
THERAPEUTIC RANGE: ABNORMAL SECS (ref 58–77)
TSH, 3RD GENERATION: 1.98 UIU/ML (ref 0.27–4.2)
UFH PPP CHRO-ACNC: 0.55 IU/ML
UFH PPP CHRO-ACNC: 0.6 IU/ML

## 2025-08-22 PROCEDURE — 97530 THERAPEUTIC ACTIVITIES: CPT

## 2025-08-22 PROCEDURE — 2060000000 HC ICU INTERMEDIATE R&B

## 2025-08-22 PROCEDURE — 84100 ASSAY OF PHOSPHORUS: CPT

## 2025-08-22 PROCEDURE — 97161 PT EVAL LOW COMPLEX 20 MIN: CPT

## 2025-08-22 PROCEDURE — 83735 ASSAY OF MAGNESIUM: CPT

## 2025-08-22 PROCEDURE — 85520 HEPARIN ASSAY: CPT

## 2025-08-22 PROCEDURE — 6370000000 HC RX 637 (ALT 250 FOR IP): Performed by: INTERNAL MEDICINE

## 2025-08-22 PROCEDURE — 93005 ELECTROCARDIOGRAM TRACING: CPT | Performed by: INTERNAL MEDICINE

## 2025-08-22 PROCEDURE — G0378 HOSPITAL OBSERVATION PER HR: HCPCS

## 2025-08-22 PROCEDURE — 83605 ASSAY OF LACTIC ACID: CPT

## 2025-08-22 PROCEDURE — 80053 COMPREHEN METABOLIC PANEL: CPT

## 2025-08-22 PROCEDURE — 6370000000 HC RX 637 (ALT 250 FOR IP): Performed by: STUDENT IN AN ORGANIZED HEALTH CARE EDUCATION/TRAINING PROGRAM

## 2025-08-22 PROCEDURE — 85610 PROTHROMBIN TIME: CPT

## 2025-08-22 PROCEDURE — 6360000002 HC RX W HCPCS: Performed by: INTERNAL MEDICINE

## 2025-08-22 PROCEDURE — 93306 TTE W/DOPPLER COMPLETE: CPT | Performed by: INTERNAL MEDICINE

## 2025-08-22 PROCEDURE — 93010 ELECTROCARDIOGRAM REPORT: CPT | Performed by: INTERNAL MEDICINE

## 2025-08-22 PROCEDURE — 97116 GAIT TRAINING THERAPY: CPT

## 2025-08-22 PROCEDURE — 93306 TTE W/DOPPLER COMPLETE: CPT

## 2025-08-22 PROCEDURE — 94760 N-INVAS EAR/PLS OXIMETRY 1: CPT

## 2025-08-22 RX ORDER — AMLODIPINE BESYLATE 10 MG/1
10 TABLET ORAL DAILY
Status: ON HOLD | COMMUNITY
End: 2025-08-22 | Stop reason: HOSPADM

## 2025-08-22 RX ORDER — METOPROLOL TARTRATE 25 MG/1
25 TABLET, FILM COATED ORAL 2 TIMES DAILY
Qty: 60 TABLET | Refills: 3 | Status: SHIPPED | OUTPATIENT
Start: 2025-08-22

## 2025-08-22 RX ORDER — BUSPIRONE HYDROCHLORIDE 5 MG/1
5 TABLET ORAL 2 TIMES DAILY
Status: DISCONTINUED | OUTPATIENT
Start: 2025-08-22 | End: 2025-08-22 | Stop reason: HOSPADM

## 2025-08-22 RX ORDER — METOPROLOL TARTRATE 25 MG/1
25 TABLET, FILM COATED ORAL 2 TIMES DAILY
Status: DISCONTINUED | OUTPATIENT
Start: 2025-08-22 | End: 2025-08-22 | Stop reason: HOSPADM

## 2025-08-22 RX ORDER — AMOXICILLIN 500 MG/1
500 CAPSULE ORAL ONCE
Status: COMPLETED | OUTPATIENT
Start: 2025-08-22 | End: 2025-08-22

## 2025-08-22 RX ORDER — DIPHENHYDRAMINE HYDROCHLORIDE 50 MG/ML
25 INJECTION, SOLUTION INTRAMUSCULAR; INTRAVENOUS EVERY 6 HOURS PRN
Status: DISCONTINUED | OUTPATIENT
Start: 2025-08-22 | End: 2025-08-22 | Stop reason: HOSPADM

## 2025-08-22 RX ORDER — AMOXICILLIN 125 MG/5ML
50 SUSPENSION, RECONSTITUTED, ORAL (ML) ORAL
Status: COMPLETED | OUTPATIENT
Start: 2025-08-22 | End: 2025-08-22

## 2025-08-22 RX ORDER — EPINEPHRINE 1 MG/ML
0.3 INJECTION, SOLUTION, CONCENTRATE INTRAVENOUS 2 TIMES DAILY PRN
Status: DISCONTINUED | OUTPATIENT
Start: 2025-08-22 | End: 2025-08-22 | Stop reason: HOSPADM

## 2025-08-22 RX ADMIN — METOPROLOL TARTRATE 25 MG: 25 TABLET, FILM COATED ORAL at 10:37

## 2025-08-22 RX ADMIN — AMOXICILLIN 50 MG: 125 POWDER, FOR SUSPENSION ORAL at 11:41

## 2025-08-22 RX ADMIN — AMOXICILLIN 500 MG: 500 CAPSULE ORAL at 13:05

## 2025-08-22 RX ADMIN — BUSPIRONE HYDROCHLORIDE 5 MG: 5 TABLET ORAL at 09:04

## 2025-08-22 RX ADMIN — ENOXAPARIN SODIUM 70 MG: 100 INJECTION SUBCUTANEOUS at 09:05

## 2025-08-22 RX ADMIN — ACETAMINOPHEN 650 MG: 325 TABLET ORAL at 09:57

## 2025-08-22 ASSESSMENT — PAIN - FUNCTIONAL ASSESSMENT
PAIN_FUNCTIONAL_ASSESSMENT: 0-10
PAIN_FUNCTIONAL_ASSESSMENT: 0-10

## 2025-08-22 ASSESSMENT — PAIN DESCRIPTION - DESCRIPTORS: DESCRIPTORS: ACHING

## 2025-08-22 ASSESSMENT — PAIN SCALES - GENERAL
PAINLEVEL_OUTOF10: 5
PAINLEVEL_OUTOF10: 0
PAINLEVEL_OUTOF10: 0
PAINLEVEL_OUTOF10: 5
PAINLEVEL_OUTOF10: 0

## 2025-08-22 ASSESSMENT — PAIN DESCRIPTION - LOCATION: LOCATION: ARM

## 2025-08-25 ENCOUNTER — TELEPHONE (OUTPATIENT)
Age: 85
End: 2025-08-25

## 2025-08-27 ENCOUNTER — TELEMEDICINE (OUTPATIENT)
Age: 85
End: 2025-08-27

## 2025-08-27 DIAGNOSIS — I10 ESSENTIAL HYPERTENSION: ICD-10-CM

## 2025-08-27 DIAGNOSIS — I48.91 ATRIAL FIBRILLATION, UNSPECIFIED TYPE (HCC): Primary | ICD-10-CM

## 2025-08-27 DIAGNOSIS — R13.10 DYSPHAGIA, UNSPECIFIED TYPE: ICD-10-CM

## 2025-08-27 DIAGNOSIS — N18.31 STAGE 3A CHRONIC KIDNEY DISEASE (HCC): ICD-10-CM

## 2025-08-27 RX ORDER — PROPOFOL 10 MG/ML
INJECTION, EMULSION INTRAVENOUS
Status: DISPENSED
Start: 2025-08-27 | End: 2025-08-27

## 2025-08-27 ASSESSMENT — PATIENT HEALTH QUESTIONNAIRE - PHQ9
SUM OF ALL RESPONSES TO PHQ QUESTIONS 1-9: 0
1. LITTLE INTEREST OR PLEASURE IN DOING THINGS: NOT AT ALL
2. FEELING DOWN, DEPRESSED OR HOPELESS: NOT AT ALL
SUM OF ALL RESPONSES TO PHQ QUESTIONS 1-9: 0

## (undated) DEVICE — GLOVE ORTHO 7   MSG9470

## (undated) DEVICE — SEAL UNIV 5-8MM DISP BX/10 -- DA VINCI XI - SNGL USE

## (undated) DEVICE — ARM DRAPE

## (undated) DEVICE — SOL IRRIGATION INJ NACL 0.9% 500ML BTL

## (undated) DEVICE — 3M™ IOBAN™ 2 ANTIMICROBIAL INCISE DRAPE 6648EZ: Brand: IOBAN™ 2

## (undated) DEVICE — AIRSEAL BIFURCATED SMOKE EVAC FILTERED TUBE SET: Brand: AIRSEAL

## (undated) DEVICE — ROBOTIC GENERAL-SFMC: Brand: MEDLINE INDUSTRIES, INC.

## (undated) DEVICE — SUTURE EASE CROSSBOW CLSR SYS

## (undated) DEVICE — SUTURE MCRYL SZ 4-0 L27IN ABSRB UD L19MM PS-2 1/2 CIR PRIM Y426H

## (undated) DEVICE — BNDG ADH FABRIC 2X4IN ST LF --

## (undated) DEVICE — ANCHOR TISSUE RETRIEVAL SYSTEM, BAG SIZE 125 ML, PORT SIZE 8 MM: Brand: ANCHOR TISSUE RETRIEVAL SYSTEM

## (undated) DEVICE — TROCAR: Brand: KII® OPTICAL ACCESS SYSTEM

## (undated) DEVICE — STRIP,CLOSURE,WOUND,MEDI-STRIP,1/2X4: Brand: MEDLINE

## (undated) DEVICE — COVER MPLR TIP CRV SCIS ACC DA VINCI

## (undated) DEVICE — BLADELESS OBTURATOR: Brand: WECK VISTA

## (undated) DEVICE — TRANSFER SET 3": Brand: MEDLINE INDUSTRIES, INC.

## (undated) DEVICE — ELECTRO LUBE IS A SINGLE PATIENT USE DEVICE THAT IS INTENDED TO BE USED ON ELECTROSURGICAL ELECTRODES TO REDUCE STICKING.: Brand: KEY SURGICAL ELECTRO LUBE

## (undated) DEVICE — LAPAROSCOPIC TROCAR SLEEVE/SINGLE USE: Brand: KII® OPTICAL ACCESS SYSTEM

## (undated) DEVICE — CLIP INT M L POLYMER LOK LIG HEM O LOK

## (undated) DEVICE — SUTURE SZ 0 27IN 5/8 CIR UR-6  TAPER PT VIOLET ABSRB VICRYL J603H